# Patient Record
Sex: MALE
[De-identification: names, ages, dates, MRNs, and addresses within clinical notes are randomized per-mention and may not be internally consistent; named-entity substitution may affect disease eponyms.]

---

## 2017-02-15 ENCOUNTER — MEDICATION RENEWAL (OUTPATIENT)
Age: 62
End: 2017-02-15

## 2017-02-24 ENCOUNTER — RX RENEWAL (OUTPATIENT)
Age: 62
End: 2017-02-24

## 2017-03-13 ENCOUNTER — RX RENEWAL (OUTPATIENT)
Age: 62
End: 2017-03-13

## 2017-04-10 ENCOUNTER — RX RENEWAL (OUTPATIENT)
Age: 62
End: 2017-04-10

## 2017-05-08 ENCOUNTER — RX RENEWAL (OUTPATIENT)
Age: 62
End: 2017-05-08

## 2017-05-09 ENCOUNTER — APPOINTMENT (OUTPATIENT)
Dept: FAMILY MEDICINE | Facility: CLINIC | Age: 62
End: 2017-05-09

## 2017-05-09 VITALS
SYSTOLIC BLOOD PRESSURE: 126 MMHG | BODY MASS INDEX: 36.79 KG/M2 | DIASTOLIC BLOOD PRESSURE: 80 MMHG | WEIGHT: 257 LBS | HEIGHT: 70 IN

## 2017-05-10 LAB
ALBUMIN SERPL ELPH-MCNC: 4.6 G/DL
ALP BLD-CCNC: 40 U/L
ALT SERPL-CCNC: 39 U/L
ANION GAP SERPL CALC-SCNC: 21 MMOL/L
AST SERPL-CCNC: 33 U/L
BASOPHILS # BLD AUTO: 0.01 K/UL
BASOPHILS NFR BLD AUTO: 0.2 %
BILIRUB SERPL-MCNC: 0.8 MG/DL
BUN SERPL-MCNC: 22 MG/DL
CALCIUM SERPL-MCNC: 10.3 MG/DL
CHLORIDE SERPL-SCNC: 105 MMOL/L
CHOLEST SERPL-MCNC: 116 MG/DL
CHOLEST/HDLC SERPL: 3 RATIO
CO2 SERPL-SCNC: 20 MMOL/L
CREAT SERPL-MCNC: 1.04 MG/DL
EOSINOPHIL # BLD AUTO: 0.06 K/UL
EOSINOPHIL NFR BLD AUTO: 1 %
GLUCOSE SERPL-MCNC: 94 MG/DL
HCT VFR BLD CALC: 46.4 %
HDLC SERPL-MCNC: 34 MG/DL
HGB BLD-MCNC: 15.1 G/DL
IMM GRANULOCYTES NFR BLD AUTO: 0.2 %
LDLC SERPL CALC-MCNC: 65 MG/DL
LYMPHOCYTES # BLD AUTO: 1.24 K/UL
LYMPHOCYTES NFR BLD AUTO: 20.6 %
MAN DIFF?: NORMAL
MCHC RBC-ENTMCNC: 32.5 GM/DL
MCHC RBC-ENTMCNC: 33.7 PG
MCV RBC AUTO: 103.6 FL
MONOCYTES # BLD AUTO: 0.49 K/UL
MONOCYTES NFR BLD AUTO: 8.1 %
NEUTROPHILS # BLD AUTO: 4.22 K/UL
NEUTROPHILS NFR BLD AUTO: 69.9 %
PLATELET # BLD AUTO: 214 K/UL
POTASSIUM SERPL-SCNC: 5.2 MMOL/L
PROT SERPL-MCNC: 6.9 G/DL
RBC # BLD: 4.48 M/UL
RBC # FLD: 14.3 %
SODIUM SERPL-SCNC: 146 MMOL/L
TRIGL SERPL-MCNC: 83 MG/DL
URATE SERPL-MCNC: 5 MG/DL
WBC # FLD AUTO: 6.03 K/UL

## 2017-05-24 ENCOUNTER — MEDICATION RENEWAL (OUTPATIENT)
Age: 62
End: 2017-05-24

## 2017-08-20 ENCOUNTER — RX RENEWAL (OUTPATIENT)
Age: 62
End: 2017-08-20

## 2017-08-31 ENCOUNTER — APPOINTMENT (OUTPATIENT)
Dept: FAMILY MEDICINE | Facility: CLINIC | Age: 62
End: 2017-08-31
Payer: COMMERCIAL

## 2017-08-31 VITALS
WEIGHT: 242 LBS | HEIGHT: 70 IN | BODY MASS INDEX: 34.65 KG/M2 | DIASTOLIC BLOOD PRESSURE: 70 MMHG | SYSTOLIC BLOOD PRESSURE: 120 MMHG

## 2017-08-31 PROCEDURE — G0008: CPT

## 2017-08-31 PROCEDURE — 90688 IIV4 VACCINE SPLT 0.5 ML IM: CPT

## 2017-08-31 PROCEDURE — 36415 COLL VENOUS BLD VENIPUNCTURE: CPT

## 2017-08-31 PROCEDURE — 99214 OFFICE O/P EST MOD 30 MIN: CPT | Mod: 25

## 2017-08-31 RX ORDER — PENICILLIN V POTASSIUM 500 MG/1
500 TABLET, FILM COATED ORAL
Qty: 28 | Refills: 0 | Status: COMPLETED | COMMUNITY
Start: 2017-04-17

## 2017-09-01 LAB
ALBUMIN SERPL ELPH-MCNC: 4.4 G/DL
ALP BLD-CCNC: 41 U/L
ALT SERPL-CCNC: 22 U/L
ANION GAP SERPL CALC-SCNC: 17 MMOL/L
AST SERPL-CCNC: 23 U/L
BASOPHILS # BLD AUTO: 0.01 K/UL
BASOPHILS NFR BLD AUTO: 0.1 %
BILIRUB SERPL-MCNC: 0.7 MG/DL
BUN SERPL-MCNC: 16 MG/DL
CALCIUM SERPL-MCNC: 9.1 MG/DL
CHLORIDE SERPL-SCNC: 104 MMOL/L
CHOLEST SERPL-MCNC: 133 MG/DL
CHOLEST/HDLC SERPL: 3.5 RATIO
CO2 SERPL-SCNC: 22 MMOL/L
CREAT SERPL-MCNC: 0.82 MG/DL
EOSINOPHIL # BLD AUTO: 0.12 K/UL
EOSINOPHIL NFR BLD AUTO: 1.8 %
GLUCOSE SERPL-MCNC: 88 MG/DL
HBA1C MFR BLD HPLC: 5.4 %
HCT VFR BLD CALC: 45.4 %
HDLC SERPL-MCNC: 38 MG/DL
HGB BLD-MCNC: 14.6 G/DL
IMM GRANULOCYTES NFR BLD AUTO: 0.1 %
LDLC SERPL CALC-MCNC: 80 MG/DL
LYMPHOCYTES # BLD AUTO: 1.8 K/UL
LYMPHOCYTES NFR BLD AUTO: 26.4 %
MAN DIFF?: NORMAL
MCHC RBC-ENTMCNC: 32.2 GM/DL
MCHC RBC-ENTMCNC: 33 PG
MCV RBC AUTO: 102.5 FL
MONOCYTES # BLD AUTO: 0.5 K/UL
MONOCYTES NFR BLD AUTO: 7.3 %
NEUTROPHILS # BLD AUTO: 4.39 K/UL
NEUTROPHILS NFR BLD AUTO: 64.3 %
PLATELET # BLD AUTO: 222 K/UL
POTASSIUM SERPL-SCNC: 4.7 MMOL/L
PROT SERPL-MCNC: 6.9 G/DL
RBC # BLD: 4.43 M/UL
RBC # FLD: 13.5 %
SODIUM SERPL-SCNC: 143 MMOL/L
TRIGL SERPL-MCNC: 74 MG/DL
URATE SERPL-MCNC: 4.6 MG/DL
WBC # FLD AUTO: 6.83 K/UL

## 2017-10-31 ENCOUNTER — RX RENEWAL (OUTPATIENT)
Age: 62
End: 2017-10-31

## 2017-11-07 ENCOUNTER — MEDICATION RENEWAL (OUTPATIENT)
Age: 62
End: 2017-11-07

## 2017-11-14 ENCOUNTER — RX RENEWAL (OUTPATIENT)
Age: 62
End: 2017-11-14

## 2018-01-23 ENCOUNTER — APPOINTMENT (OUTPATIENT)
Dept: FAMILY MEDICINE | Facility: CLINIC | Age: 63
End: 2018-01-23
Payer: COMMERCIAL

## 2018-01-23 VITALS
WEIGHT: 240 LBS | SYSTOLIC BLOOD PRESSURE: 126 MMHG | HEIGHT: 70 IN | BODY MASS INDEX: 34.36 KG/M2 | DIASTOLIC BLOOD PRESSURE: 78 MMHG

## 2018-01-23 PROCEDURE — 36415 COLL VENOUS BLD VENIPUNCTURE: CPT

## 2018-01-23 PROCEDURE — 99213 OFFICE O/P EST LOW 20 MIN: CPT | Mod: 25

## 2018-01-25 LAB
ALBUMIN SERPL ELPH-MCNC: 4.5 G/DL
ALP BLD-CCNC: 34 U/L
ALT SERPL-CCNC: 22 U/L
ANION GAP SERPL CALC-SCNC: 15 MMOL/L
AST SERPL-CCNC: 18 U/L
BASOPHILS # BLD AUTO: 0.02 K/UL
BASOPHILS NFR BLD AUTO: 0.3 %
BILIRUB SERPL-MCNC: 0.6 MG/DL
BUN SERPL-MCNC: 24 MG/DL
CALCIUM SERPL-MCNC: 9.3 MG/DL
CHLORIDE SERPL-SCNC: 106 MMOL/L
CHOLEST SERPL-MCNC: 119 MG/DL
CHOLEST/HDLC SERPL: 2.9 RATIO
CO2 SERPL-SCNC: 22 MMOL/L
CREAT SERPL-MCNC: 0.92 MG/DL
EOSINOPHIL # BLD AUTO: 0.08 K/UL
EOSINOPHIL NFR BLD AUTO: 1.4 %
GLUCOSE SERPL-MCNC: 91 MG/DL
HCT VFR BLD CALC: 43.9 %
HDLC SERPL-MCNC: 41 MG/DL
HGB BLD-MCNC: 15.1 G/DL
IMM GRANULOCYTES NFR BLD AUTO: 0.2 %
LDLC SERPL CALC-MCNC: 64 MG/DL
LYMPHOCYTES # BLD AUTO: 1.67 K/UL
LYMPHOCYTES NFR BLD AUTO: 28.8 %
MAN DIFF?: NORMAL
MCHC RBC-ENTMCNC: 33.9 PG
MCHC RBC-ENTMCNC: 34.4 GM/DL
MCV RBC AUTO: 98.4 FL
MONOCYTES # BLD AUTO: 0.53 K/UL
MONOCYTES NFR BLD AUTO: 9.2 %
NEUTROPHILS # BLD AUTO: 3.48 K/UL
NEUTROPHILS NFR BLD AUTO: 60.1 %
PLATELET # BLD AUTO: 207 K/UL
POTASSIUM SERPL-SCNC: 4.4 MMOL/L
PROT SERPL-MCNC: 6.7 G/DL
RBC # BLD: 4.46 M/UL
RBC # FLD: 13 %
SODIUM SERPL-SCNC: 143 MMOL/L
TRIGL SERPL-MCNC: 72 MG/DL
URATE SERPL-MCNC: 5 MG/DL
WBC # FLD AUTO: 5.79 K/UL

## 2018-07-20 ENCOUNTER — APPOINTMENT (OUTPATIENT)
Dept: FAMILY MEDICINE | Facility: CLINIC | Age: 63
End: 2018-07-20
Payer: COMMERCIAL

## 2018-07-20 VITALS
TEMPERATURE: 98.7 F | HEART RATE: 78 BPM | HEIGHT: 70 IN | SYSTOLIC BLOOD PRESSURE: 120 MMHG | BODY MASS INDEX: 34.93 KG/M2 | WEIGHT: 244 LBS | DIASTOLIC BLOOD PRESSURE: 80 MMHG | OXYGEN SATURATION: 98 %

## 2018-07-20 PROCEDURE — 36415 COLL VENOUS BLD VENIPUNCTURE: CPT

## 2018-07-20 PROCEDURE — 99214 OFFICE O/P EST MOD 30 MIN: CPT | Mod: 25

## 2018-07-20 NOTE — HEALTH RISK ASSESSMENT
[] : No [No falls in past year] : Patient reported no falls in the past year [0] : 2) Feeling down, depressed, or hopeless: Not at all (0) [[id number]] : 0

## 2018-07-20 NOTE — HISTORY OF PRESENT ILLNESS
[de-identified] : 62 year old male is here for a followup visit. Patient is here for medication renewals and for blood work discussion. Medications and allergies were reviewed and assessed.  There has been no new medications since the last visit. Patient is feeling well with no active changes or issues since His last visit.\par

## 2018-07-20 NOTE — ASSESSMENT
[FreeTextEntry1] : The diagnosis of high cholesterol is established and patient is currently taking medications. Blood work was drawn in office and results will be reviewed and followed. The patient was counseled on a low cholesterol diet and on medication compliance. Patient was advised to generally limit foods fried in oil, high in saturated fat, cheese, eggs and red meat. Patient was advised to continue on current medications and to followup in office for necessary blood work in three months.\par \par gout - controlled\par \par trigeminal neuralgia - improved\par watches sugars

## 2018-07-20 NOTE — PHYSICAL EXAM
[Well Nourished] : well nourished [Clear to Auscultation] : lungs were clear to auscultation bilaterally [No Accessory Muscle Use] : no accessory muscle use [Regular Rhythm] : with a regular rhythm [Soft] : abdomen soft [No Joint Swelling] : no joint swelling [No Rash] : no rash [Normal Gait] : normal gait

## 2018-07-21 LAB
ALBUMIN SERPL ELPH-MCNC: 4.4 G/DL
ALP BLD-CCNC: 31 U/L
ALT SERPL-CCNC: 29 U/L
ANION GAP SERPL CALC-SCNC: 14 MMOL/L
AST SERPL-CCNC: 25 U/L
BASOPHILS # BLD AUTO: 0.01 K/UL
BASOPHILS NFR BLD AUTO: 0.2 %
BILIRUB SERPL-MCNC: 0.7 MG/DL
BUN SERPL-MCNC: 20 MG/DL
CALCIUM SERPL-MCNC: 8.9 MG/DL
CHLORIDE SERPL-SCNC: 108 MMOL/L
CHOLEST SERPL-MCNC: 121 MG/DL
CHOLEST/HDLC SERPL: 3.5 RATIO
CO2 SERPL-SCNC: 21 MMOL/L
CREAT SERPL-MCNC: 0.78 MG/DL
EOSINOPHIL # BLD AUTO: 0.1 K/UL
EOSINOPHIL NFR BLD AUTO: 2 %
GLUCOSE SERPL-MCNC: 96 MG/DL
HBA1C MFR BLD HPLC: 5.3 %
HCT VFR BLD CALC: 43.5 %
HDLC SERPL-MCNC: 35 MG/DL
HGB BLD-MCNC: 14.3 G/DL
IMM GRANULOCYTES NFR BLD AUTO: 0.2 %
LDLC SERPL CALC-MCNC: 66 MG/DL
LYMPHOCYTES # BLD AUTO: 1.62 K/UL
LYMPHOCYTES NFR BLD AUTO: 32.3 %
MAN DIFF?: NORMAL
MCHC RBC-ENTMCNC: 32.4 PG
MCHC RBC-ENTMCNC: 32.9 GM/DL
MCV RBC AUTO: 98.4 FL
MONOCYTES # BLD AUTO: 0.43 K/UL
MONOCYTES NFR BLD AUTO: 8.6 %
NEUTROPHILS # BLD AUTO: 2.85 K/UL
NEUTROPHILS NFR BLD AUTO: 56.7 %
PLATELET # BLD AUTO: 196 K/UL
POTASSIUM SERPL-SCNC: 4 MMOL/L
PROT SERPL-MCNC: 6.2 G/DL
RBC # BLD: 4.42 M/UL
RBC # FLD: 13.7 %
SODIUM SERPL-SCNC: 143 MMOL/L
TRIGL SERPL-MCNC: 98 MG/DL
URATE SERPL-MCNC: 4.8 MG/DL
WBC # FLD AUTO: 5.02 K/UL

## 2019-01-28 ENCOUNTER — RX RENEWAL (OUTPATIENT)
Age: 64
End: 2019-01-28

## 2019-01-28 ENCOUNTER — MEDICATION RENEWAL (OUTPATIENT)
Age: 64
End: 2019-01-28

## 2019-02-01 ENCOUNTER — RX RENEWAL (OUTPATIENT)
Age: 64
End: 2019-02-01

## 2019-02-08 ENCOUNTER — APPOINTMENT (OUTPATIENT)
Dept: FAMILY MEDICINE | Facility: CLINIC | Age: 64
End: 2019-02-08
Payer: COMMERCIAL

## 2019-02-08 VITALS
HEIGHT: 70 IN | DIASTOLIC BLOOD PRESSURE: 80 MMHG | SYSTOLIC BLOOD PRESSURE: 126 MMHG | BODY MASS INDEX: 34.79 KG/M2 | WEIGHT: 243 LBS

## 2019-02-08 DIAGNOSIS — Z83.3 FAMILY HISTORY OF DIABETES MELLITUS: ICD-10-CM

## 2019-02-08 DIAGNOSIS — Z82.49 FAMILY HISTORY OF ISCHEMIC HEART DISEASE AND OTHER DISEASES OF THE CIRCULATORY SYSTEM: ICD-10-CM

## 2019-02-08 DIAGNOSIS — I83.93 ASYMPTOMATIC VARICOSE VEINS OF BILATERAL LOWER EXTREMITIES: ICD-10-CM

## 2019-02-08 PROCEDURE — 36415 COLL VENOUS BLD VENIPUNCTURE: CPT

## 2019-02-08 PROCEDURE — 93000 ELECTROCARDIOGRAM COMPLETE: CPT

## 2019-02-08 PROCEDURE — 99396 PREV VISIT EST AGE 40-64: CPT | Mod: 25

## 2019-02-08 NOTE — HEALTH RISK ASSESSMENT
[Patient declined colonoscopy] : Patient declined colonoscopy [With Significant Other] : lives with significant other [# of Members in Household ___] :  household currently consist of [unfilled] member(s) [Employed] : employed [Reports changes in vision] : Reports changes in vision [Smoke Detector] : smoke detector [Carbon Monoxide Detector] : carbon monoxide detector [Seat Belt] :  uses seat belt [] : No [de-identified] : Neurologist-hasn't seen in a few year [de-identified] : Quit years ago [Reports changes in hearing] : Reports no changes in hearing [Reports changes in dental health] : Reports no changes in dental health [Sunscreen] : does not use sunscreen [FreeTextEntry2] : Part-time  [de-identified] : recent new glasses [de-identified] : sun coverage

## 2019-02-08 NOTE — REVIEW OF SYSTEMS
[Frequency] : frequency [Headache] : no headache [Dizziness] : no dizziness [Insomnia] : no insomnia [Negative] : Musculoskeletal [de-identified] : occassional trigeminal neuralgia symptoms

## 2019-02-08 NOTE — PHYSICAL EXAM
[No Acute Distress] : no acute distress [Well Nourished] : well nourished [Well Developed] : well developed [Well-Appearing] : well-appearing [Normal Outer Ear/Nose] : the outer ears and nose were normal in appearance [Normal Oropharynx] : the oropharynx was normal [Normal TMs] : both tympanic membranes were normal [Supple] : supple [No Lymphadenopathy] : no lymphadenopathy [No Respiratory Distress] : no respiratory distress  [Clear to Auscultation] : lungs were clear to auscultation bilaterally [No Accessory Muscle Use] : no accessory muscle use [Normal Rate] : normal rate  [Regular Rhythm] : with a regular rhythm [Normal S1, S2] : normal S1 and S2 [Soft] : abdomen soft [Non Tender] : non-tender [Non-distended] : non-distended [No Masses] : no abdominal mass palpated [Normal Bowel Sounds] : normal bowel sounds [Normal Affect] : the affect was normal [Alert and Oriented x3] : oriented to person, place, and time [Normal Mood] : the mood was normal [No Extremity Clubbing/Cyanosis] : no extremity clubbing/cyanosis [Grossly Normal Strength/Tone] : grossly normal strength/tone [de-identified] : trace edema lower extremities bilaterally, varicose veins. Nontender to palpation, no redness.

## 2019-02-08 NOTE — HISTORY OF PRESENT ILLNESS
[FreeTextEntry1] : Here for annual physical [de-identified] : Here for annual physical. \par \par History of trigeminal neuralgia-feels like his symptoms are more frequent/irritation on right side of face.  \par Admits to occasional fluttering sensation at rest, not when he is exercising. No chest pain, SOB. \par \par Also notes some right leg irritation around his veins.  Hx of DVT. \par Bicycles for exercise, varied diet.\par Had recent eye exam.

## 2019-02-11 LAB
25(OH)D3 SERPL-MCNC: 26.8 NG/ML
ALBUMIN SERPL ELPH-MCNC: 4.8 G/DL
ALP BLD-CCNC: 28 U/L
ALT SERPL-CCNC: 26 U/L
ANION GAP SERPL CALC-SCNC: 16 MMOL/L
APPEARANCE: CLEAR
AST SERPL-CCNC: 23 U/L
BASOPHILS # BLD AUTO: 0.02 K/UL
BASOPHILS NFR BLD AUTO: 0.4 %
BILIRUB SERPL-MCNC: 0.6 MG/DL
BILIRUBIN URINE: NEGATIVE
BLOOD URINE: NEGATIVE
BUN SERPL-MCNC: 23 MG/DL
CALCIUM SERPL-MCNC: 9.8 MG/DL
CHLORIDE SERPL-SCNC: 103 MMOL/L
CHOLEST SERPL-MCNC: 114 MG/DL
CHOLEST/HDLC SERPL: 2.9 RATIO
CO2 SERPL-SCNC: 23 MMOL/L
COLOR: YELLOW
CREAT SERPL-MCNC: 0.93 MG/DL
EOSINOPHIL # BLD AUTO: 0.1 K/UL
EOSINOPHIL NFR BLD AUTO: 2.1 %
GLUCOSE QUALITATIVE U: NEGATIVE MG/DL
GLUCOSE SERPL-MCNC: 101 MG/DL
HBA1C MFR BLD HPLC: 5.3 %
HCT VFR BLD CALC: 46.6 %
HDLC SERPL-MCNC: 39 MG/DL
HGB BLD-MCNC: 15.2 G/DL
IMM GRANULOCYTES NFR BLD AUTO: 0.2 %
KETONES URINE: NEGATIVE
LDLC SERPL CALC-MCNC: 63 MG/DL
LEUKOCYTE ESTERASE URINE: NEGATIVE
LYMPHOCYTES # BLD AUTO: 1.6 K/UL
LYMPHOCYTES NFR BLD AUTO: 34.3 %
MAN DIFF?: NORMAL
MCHC RBC-ENTMCNC: 32.6 GM/DL
MCHC RBC-ENTMCNC: 33.3 PG
MCV RBC AUTO: 102 FL
MONOCYTES # BLD AUTO: 0.49 K/UL
MONOCYTES NFR BLD AUTO: 10.5 %
NEUTROPHILS # BLD AUTO: 2.44 K/UL
NEUTROPHILS NFR BLD AUTO: 52.5 %
NITRITE URINE: NEGATIVE
PH URINE: 6
PLATELET # BLD AUTO: 211 K/UL
POTASSIUM SERPL-SCNC: 4.6 MMOL/L
PROT SERPL-MCNC: 6.7 G/DL
PROTEIN URINE: NEGATIVE MG/DL
PSA SERPL-MCNC: 0.5 NG/ML
RBC # BLD: 4.57 M/UL
RBC # FLD: 13.5 %
SODIUM SERPL-SCNC: 142 MMOL/L
SPECIFIC GRAVITY URINE: 1.02
TRIGL SERPL-MCNC: 62 MG/DL
TSH SERPL-ACNC: 1.47 UIU/ML
URATE SERPL-MCNC: 5 MG/DL
UROBILINOGEN URINE: NEGATIVE MG/DL
WBC # FLD AUTO: 4.66 K/UL

## 2019-02-22 ENCOUNTER — RX RENEWAL (OUTPATIENT)
Age: 64
End: 2019-02-22

## 2019-02-23 ENCOUNTER — RX RENEWAL (OUTPATIENT)
Age: 64
End: 2019-02-23

## 2019-05-13 ENCOUNTER — APPOINTMENT (OUTPATIENT)
Dept: OTOLARYNGOLOGY | Facility: CLINIC | Age: 64
End: 2019-05-13
Payer: COMMERCIAL

## 2019-05-13 VITALS
BODY MASS INDEX: 35.79 KG/M2 | HEART RATE: 70 BPM | SYSTOLIC BLOOD PRESSURE: 126 MMHG | DIASTOLIC BLOOD PRESSURE: 86 MMHG | WEIGHT: 250 LBS | HEIGHT: 70 IN

## 2019-05-13 DIAGNOSIS — G50.0 TRIGEMINAL NEURALGIA: ICD-10-CM

## 2019-05-13 DIAGNOSIS — H61.22 IMPACTED CERUMEN, LEFT EAR: ICD-10-CM

## 2019-05-13 PROCEDURE — 99204 OFFICE O/P NEW MOD 45 MIN: CPT | Mod: 25

## 2019-05-13 PROCEDURE — 92557 COMPREHENSIVE HEARING TEST: CPT

## 2019-05-13 PROCEDURE — 92567 TYMPANOMETRY: CPT

## 2019-05-13 PROCEDURE — G0268 REMOVAL OF IMPACTED WAX MD: CPT

## 2019-05-13 NOTE — ASSESSMENT
[FreeTextEntry1] : Patient with a history of a right-sided trigeminal neuralgia had neurosurgical procedure that decompresses trigeminal nucleus from a vein intracranially he still suffering from some of the side effects from successful surgery he comes in complaining o'clock left ear cerumen impaction was curetted out of her grand confirm perfectly normal presbycusis at pattern hearing loss patient was reassured no further acute intervention is indicated at this time.

## 2019-05-13 NOTE — HISTORY OF PRESENT ILLNESS
[de-identified] : PAtient is having left sided ear pain and some minor noises in the left ear that come and go. he has some dull aching pressure in the left side of the head as well. He states this all started about a week ago when he had some tingling in the left eye over a week ago. He did have a microvascular decompression in the right side of the face due to trigeminal neuralgia back in 2015. He is started to have some symptoms of trigeminal neuralgia but he's not diagnosed with that yet- he feels eyebrow tingling with some itching in the left cheek as well. He is due to have what he says is a brain scan after seeing his neurologist. He went for a dental scan and a blood test. THe noises in the left ear do not go along with his heartbeat but it is an irregular pulsation noise. He admits that he tried to put some wax cleaning liquid in the left ear but it just made the ear worse. he also admits to using Qtips

## 2019-05-21 ENCOUNTER — RX RENEWAL (OUTPATIENT)
Age: 64
End: 2019-05-21

## 2019-08-13 ENCOUNTER — RX RENEWAL (OUTPATIENT)
Age: 64
End: 2019-08-13

## 2019-08-16 ENCOUNTER — APPOINTMENT (OUTPATIENT)
Dept: FAMILY MEDICINE | Facility: CLINIC | Age: 64
End: 2019-08-16
Payer: COMMERCIAL

## 2019-08-16 VITALS
HEIGHT: 70 IN | HEART RATE: 75 BPM | RESPIRATION RATE: 18 BRPM | BODY MASS INDEX: 34.79 KG/M2 | DIASTOLIC BLOOD PRESSURE: 84 MMHG | WEIGHT: 243 LBS | OXYGEN SATURATION: 96 % | SYSTOLIC BLOOD PRESSURE: 120 MMHG

## 2019-08-16 PROCEDURE — 36415 COLL VENOUS BLD VENIPUNCTURE: CPT

## 2019-08-16 PROCEDURE — 99214 OFFICE O/P EST MOD 30 MIN: CPT | Mod: 25

## 2019-08-16 NOTE — ASSESSMENT
[FreeTextEntry1] : cholesterol\par The diagnosis of high cholesterol is established and patient is currently taking medications. Blood work was drawn in office and results will be reviewed and followed. The patient was counseled on a low cholesterol diet and on medication compliance. Patient was advised to generally limit foods fried in oil, high in saturated fat, cheese, eggs and red meat. Patient was advised to continue on current medications and to followup in office for necessary blood work in three months.\par \par gout - controlled\par will check uric acid\par \par trigeminal neuralgia - improved\par watches sugars

## 2019-08-16 NOTE — HEALTH RISK ASSESSMENT
[] : No [No falls in past year] : Patient reported no falls in the past year [0] : 2) Feeling down, depressed, or hopeless: Not at all (0) [[id number]] : 0

## 2019-08-16 NOTE — PHYSICAL EXAM
[Well Nourished] : well nourished [Clear to Auscultation] : lungs were clear to auscultation bilaterally [No Accessory Muscle Use] : no accessory muscle use [Regular Rhythm] : with a regular rhythm [Soft] : abdomen soft [No Joint Swelling] : no joint swelling [No Rash] : no rash [Normal Gait] : normal gait [Normal Insight/Judgement] : insight and judgment were intact

## 2019-08-16 NOTE — HISTORY OF PRESENT ILLNESS
[de-identified] : 63 year old male is here for a followup visit. Patient is here for medication renewals and for blood work discussion. Medications and allergies were reviewed and assessed.  There has been no new medications since the last visit. Patient is feeling well with no active changes or issues since His last visit.\par

## 2019-08-18 LAB
ALBUMIN SERPL ELPH-MCNC: 4.7 G/DL
ALP BLD-CCNC: 30 U/L
ALT SERPL-CCNC: 36 U/L
ANION GAP SERPL CALC-SCNC: 14 MMOL/L
AST SERPL-CCNC: 31 U/L
BILIRUB SERPL-MCNC: 0.6 MG/DL
BUN SERPL-MCNC: 20 MG/DL
CALCIUM SERPL-MCNC: 9.4 MG/DL
CHLORIDE SERPL-SCNC: 108 MMOL/L
CHOLEST SERPL-MCNC: 106 MG/DL
CHOLEST/HDLC SERPL: 3.7 RATIO
CO2 SERPL-SCNC: 22 MMOL/L
CREAT SERPL-MCNC: 0.94 MG/DL
ESTIMATED AVERAGE GLUCOSE: 108 MG/DL
GLUCOSE SERPL-MCNC: 101 MG/DL
HBA1C MFR BLD HPLC: 5.4 %
HDLC SERPL-MCNC: 29 MG/DL
LDLC SERPL CALC-MCNC: 57 MG/DL
POTASSIUM SERPL-SCNC: 4.6 MMOL/L
PROT SERPL-MCNC: 6.6 G/DL
SODIUM SERPL-SCNC: 144 MMOL/L
TRIGL SERPL-MCNC: 100 MG/DL
URATE SERPL-MCNC: 5.6 MG/DL

## 2020-01-14 ENCOUNTER — APPOINTMENT (OUTPATIENT)
Dept: FAMILY MEDICINE | Facility: CLINIC | Age: 65
End: 2020-01-14
Payer: COMMERCIAL

## 2020-01-14 VITALS
WEIGHT: 250 LBS | DIASTOLIC BLOOD PRESSURE: 80 MMHG | HEART RATE: 78 BPM | OXYGEN SATURATION: 98 % | BODY MASS INDEX: 35.79 KG/M2 | HEIGHT: 70 IN | TEMPERATURE: 98.1 F | SYSTOLIC BLOOD PRESSURE: 120 MMHG

## 2020-01-14 DIAGNOSIS — G44.009 CLUSTER HEADACHE SYNDROME, UNSPECIFIED, NOT INTRACTABLE: ICD-10-CM

## 2020-01-14 PROCEDURE — 99495 TRANSJ CARE MGMT MOD F2F 14D: CPT

## 2020-02-02 ENCOUNTER — RX RENEWAL (OUTPATIENT)
Age: 65
End: 2020-02-02

## 2020-02-04 ENCOUNTER — RX RENEWAL (OUTPATIENT)
Age: 65
End: 2020-02-04

## 2020-02-10 ENCOUNTER — RX RENEWAL (OUTPATIENT)
Age: 65
End: 2020-02-10

## 2020-02-11 ENCOUNTER — APPOINTMENT (OUTPATIENT)
Dept: FAMILY MEDICINE | Facility: CLINIC | Age: 65
End: 2020-02-11
Payer: COMMERCIAL

## 2020-02-11 VITALS
DIASTOLIC BLOOD PRESSURE: 82 MMHG | WEIGHT: 250 LBS | BODY MASS INDEX: 35.79 KG/M2 | RESPIRATION RATE: 17 BRPM | OXYGEN SATURATION: 94 % | HEIGHT: 70 IN | HEART RATE: 90 BPM | SYSTOLIC BLOOD PRESSURE: 124 MMHG

## 2020-02-11 VITALS — TEMPERATURE: 98.5 F

## 2020-02-11 PROCEDURE — 99214 OFFICE O/P EST MOD 30 MIN: CPT

## 2020-02-11 NOTE — HISTORY OF PRESENT ILLNESS
[FreeTextEntry1] : Here for follow-up.   [de-identified] : Here for follow-up, medication refills. \par Needs refill of gout medication, no recent flares. \par Saturday mild temperature.\par Diarrhea-loose stools today. 2 loose stools. Improving overall. Was able to eat chinese food today. \par \par Otherwise feeling okay. \par \par \par \par

## 2020-02-11 NOTE — PHYSICAL EXAM
[Normal Oropharynx] : the oropharynx was normal [Normal] : no respiratory distress, lungs were clear to auscultation bilaterally and no accessory muscle use [de-identified] : impacted cerumen left ear

## 2020-02-11 NOTE — PLAN
[FreeTextEntry1] : Patient not fasting.  Will go to lab for fasting bloodwork, rx given.\par Monitor diet, avoid spicy, greasy, acidic. \par \par Following with Cardiology. \par \par Advised can trial Debrox drops and return for ear irrigation for impacted cerumen. \par

## 2020-02-11 NOTE — REVIEW OF SYSTEMS
[Diarrhea] : diarrhea [Negative] : Neurological [Abdominal Pain] : no abdominal pain [Vomiting] : no vomiting [Nausea] : no nausea

## 2020-02-18 LAB
ALBUMIN SERPL ELPH-MCNC: 4.8 G/DL
ALP BLD-CCNC: 30 U/L
ALT SERPL-CCNC: 22 U/L
ANION GAP SERPL CALC-SCNC: 14 MMOL/L
AST SERPL-CCNC: 20 U/L
BASOPHILS # BLD AUTO: 0.03 K/UL
BASOPHILS NFR BLD AUTO: 0.5 %
BILIRUB SERPL-MCNC: 0.6 MG/DL
BUN SERPL-MCNC: 19 MG/DL
CALCIUM SERPL-MCNC: 9.7 MG/DL
CHLORIDE SERPL-SCNC: 106 MMOL/L
CHOLEST SERPL-MCNC: 85 MG/DL
CHOLEST/HDLC SERPL: 3.1 RATIO
CO2 SERPL-SCNC: 24 MMOL/L
CREAT SERPL-MCNC: 0.92 MG/DL
EOSINOPHIL # BLD AUTO: 0.1 K/UL
EOSINOPHIL NFR BLD AUTO: 1.5 %
ESTIMATED AVERAGE GLUCOSE: 114 MG/DL
GLUCOSE SERPL-MCNC: 82 MG/DL
HBA1C MFR BLD HPLC: 5.6 %
HCT VFR BLD CALC: 44.7 %
HDLC SERPL-MCNC: 28 MG/DL
HGB BLD-MCNC: 15 G/DL
IMM GRANULOCYTES NFR BLD AUTO: 0.3 %
LDLC SERPL CALC-MCNC: 37 MG/DL
LYMPHOCYTES # BLD AUTO: 1.89 K/UL
LYMPHOCYTES NFR BLD AUTO: 28.9 %
MAN DIFF?: NORMAL
MCHC RBC-ENTMCNC: 33 PG
MCHC RBC-ENTMCNC: 33.6 GM/DL
MCV RBC AUTO: 98.2 FL
MONOCYTES # BLD AUTO: 0.6 K/UL
MONOCYTES NFR BLD AUTO: 9.2 %
NEUTROPHILS # BLD AUTO: 3.89 K/UL
NEUTROPHILS NFR BLD AUTO: 59.6 %
PLATELET # BLD AUTO: 246 K/UL
POTASSIUM SERPL-SCNC: 4.5 MMOL/L
PROT SERPL-MCNC: 6.9 G/DL
RBC # BLD: 4.55 M/UL
RBC # FLD: 12.6 %
SODIUM SERPL-SCNC: 144 MMOL/L
TRIGL SERPL-MCNC: 101 MG/DL
TSH SERPL-ACNC: 1.36 UIU/ML
URATE SERPL-MCNC: 5.3 MG/DL
WBC # FLD AUTO: 6.53 K/UL

## 2020-04-29 ENCOUNTER — RX RENEWAL (OUTPATIENT)
Age: 65
End: 2020-04-29

## 2020-05-20 ENCOUNTER — RX RENEWAL (OUTPATIENT)
Age: 65
End: 2020-05-20

## 2020-06-30 ENCOUNTER — HOSPITAL ENCOUNTER (OUTPATIENT)
Dept: GENERAL RADIOLOGY | Age: 65
Discharge: HOME | End: 2020-06-30
Payer: MEDICARE

## 2020-06-30 ENCOUNTER — HOSPITAL ENCOUNTER (OUTPATIENT)
Age: 65
Discharge: HOME | End: 2020-06-30
Payer: MEDICARE

## 2020-06-30 PROCEDURE — 72100 X-RAY EXAM L-S SPINE 2/3 VWS: CPT

## 2020-07-10 ENCOUNTER — HOSPITAL ENCOUNTER (OUTPATIENT)
Dept: PHYSICAL THERAPY | Age: 65
Setting detail: THERAPIES SERIES
Discharge: HOME | End: 2020-07-10
Payer: MEDICARE

## 2020-07-10 PROCEDURE — 97110 THERAPEUTIC EXERCISES: CPT

## 2020-07-10 PROCEDURE — 97140 MANUAL THERAPY 1/> REGIONS: CPT

## 2020-07-10 PROCEDURE — 97530 THERAPEUTIC ACTIVITIES: CPT

## 2020-07-10 PROCEDURE — 97161 PT EVAL LOW COMPLEX 20 MIN: CPT

## 2020-07-10 NOTE — PROGRESS NOTES
The following patient has been evaluated for physical therapy services. In order for therapy to continue, Medicare requires physician review of the treatment plan. Please review the attached evaluation and/or summary of the patient's plan of care, and verify that you agree by signing below and sending it back to our office. Thank you for this referral.    Physician signature_______________________ Date________________    Fax to:   [facility] [phone]    LOWER EXTREMITY PHYSICAL THERAPY EVALUATION    Evaluation Date:  7/10/2020         Patient Name:  Carlos Barajas       YOB: 1955       Medical Diagnosis:chronic ankle pain         ICD 10:      Treatment Diagnosis:  Same. Lower R leg pain    Onset Date: 2018     Referral Date: 6/30/20     Referring Physician:  Karson Zuñiga [id number]      Visits Allowed/Insurance/Certification Information:  paramount    Restrictions/Precautions:      Pt's Occupation/Job Duties:  Not employed. 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 Goes to basement 1x wk. Has 1 acre of grass. 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 He felt he fully recovered from that injury. His R lateral ankle and lateral calf started hurting 2 yrs ago, it has worsened. He  Has popping in his lateral ankle with walking. He has a total gym that he might start using. He anderson LBP that radiates primarily down the L leg., has started in his right leg. Pain    Patient describes pain to be constant after about 1 hr of standing. It will go away if he gets off of it  But this takes many hours. He continues to do all the work he wants.   Patient reports  1-2/10 pain at rest, gastroc muscle belly. Knot present in lateral gastroc. Joint Mobility/Accessory Motions  decr mobility of the R calcaneous. Globally. Increased laxity of the R ankle ant and p0st   t     Special Tests    Test Left Right Comments Test Left  Right Comments   Anterior Drawer  + laxity Neg pain Varus Stress      Posterior Drawer  +laxity Neg pain Valgus Stress      Lachmans    Scouring      VMO Tone            Functional Outcome Measure    Measure used:  NT  Score:    % Disability:      ASSESSMENT  Presents with ongoing progressive R ankle and lateral calf pain . Objectively he presents with mild weakness, ligamentous laxity of the lateral ankle both ant and post, and leg length discrepancy. PT will be necesssary and helpful to normalize these areas. Problems              Decreased strength   Decreased joint mobility- calc    Increased pain   Decreased flexibility   Poor posture/alignment    Rehabilitation Potential:  Good for goals listed below. Strengths for achieving goals include:motivated   Limitations for achieving goals include: none expected    Prognosis: [x]    Good []    Fair  []    Poor    GOALS  G Code:    Short Term Goals ( 2   weeks) Long Term Goals ( 4  weeks)   1). Establish HEP 1). (I) HEP   2). look at leg length vs L arch 2). strength to 5/5   3). strength to 4+ 3). pain improved 75%   4). decr pain 25% 4). 5). 5).   6). 6). PLAN OF CARE    To see patient  2 x/week for4   weeks for the following treatment interventions:     Therapeutic Exercise   Progressive Resistive Exercise   Modalities of Choice (Heat/Cold/US/EStim/Ionto)   Gait Training   Home Exercise Program   Manual Techniques/Mobilization   Postural Reeducation   Balance Reeducation    Thank you for the referral of this patient.      Timed Code Treatment Minutes:  45  minutes              Total Treatment Time:  60  minutes      Raul Hylton PT  license #[id number]

## 2020-07-10 NOTE — FLOWSHEET NOTE
[address] [facility] and [facility], [address]  Phone [phone]                                                     [x] Daily Treatment Note   [] Progress Note   [] Discharge Note      Date:  7/10/2020    Patient Name:  Nate Molina        :  1955    Restrictions/Precautions:      C-SSRS Triggered by Intake questionnaire (Past 2 wk assessment):   [x] No, Questionnaire did not trigger screening.   [] Yes, Patient intake triggered further evaluation      [] C-SSRS Screening completed  [] PCP notified via Plan of Care  [] Emergency services notified     Diagnosis:  Chronic R ankle pain. Treatment Diagnosis:  Same. Also hx of LBP    Insurance/Certification information:  paramount    Referring Physician:  Sy PAULSON [facility]    Plan of care signed (Y/N):      Progress report will be due (10 Rx or 30 days whichever is less):  27     Recertification will be due (POC Duration  / 90 days whichever is less): na    Visit# / total visits:  1.8    Pain level: 2-6/10    With time on his feet. Subjective:  7/10/20 about 40 yrs ago he had 300 lbs of wood fall on the back of his leg- he had a tear in his muscle. He felt he fully recovered from that injury. His R lateral ankle and lateral calf started hurting 2 yrs ago, it has worsened. He  Has popping in his lateral ankle with walking. He has a total gym that he might start using. He anderson LBP that radiates primarily down the L leg., has started in his right leg.     Pain    Patient describes pain to be constant after about 1 hr of standing. It will go away if he gets off of it  But this takes many hours. He continues to do all the work he wants. Patient reports  1-2/10 pain at rest,  5-6/10 pain with activity  Worsened by - time on his feet. Improved by- avoiding time on his feet. Pt. also reports that the knee/hip/ankle gives out, locks, pops, grinds. Yes  popping  Current Functional Limitations:  He is more limited by his LB  PLOF:indep  Pt. Sleep is disturbed? no     Patient goal for therapy:  Pain relief. Learn what to do at home.         Exercises:   Chronic R ankle pain.    (pt has lumbar DDD and anterior listhesis. L4 on L5). Exercise/Equipment Resistance/Repetitions Other comments   7/10/20 explained course and role of PT     Ankle x 3 directions Green  2x10  incr to 3x 10    Heel raise 2x10  \"    NV- gastroc and soleus stretches. Toe ex. High level balance. Look at leg length on R and arch on L. Closed chain                                                                         Pt may need lace up ankle brace for suppport. Therapeutic Exercise:   15 min    Group Therapy:      Home Exercise Program:      Therapeutic Activity:    15 min Discussed total gym at length. How to re start his ex program. Discussed LB ex. Neuromuscular Re-education:      Gait:      Manual Therapy:  15      STM over lateral gastroc while prone. calc dist mobiliz  1st and 5th rays.     Canalith Repositioning Procedure:       Modalities:      Timed Code Treatment Minutes:  45    Total Treatment Minutes:  60   eval  TA,  Ex,  man    Medicare Cap Total YTD:      Treatment/Activity Tolerance:    [x] Patient tolerated treatment well [] Patient limited by fatigue   [] Patient limited by pain [] Patient limited by other medical complications   [] Other:     Prognosis: [x] Good [] Fair  [] Poor    Patient Requires Follow-up:  [x] Yes  [] No    Plan: [] Continue per plan of care [] Alter current plan (see comments)   [] Plan of care initiated [] Hold pending MD visit [] Discharge    Plan for Next Session:      See Progress Note: [] Yes  [] No       Next due:         Electronically signed by:  Festus Esposito [id number]    Note: If patient does not return for scheduled/ recommended follow up visits, this note will serve as a discharge from care along with most recent update on progress. Outpatient Physical Therapy  Progress Note      Progress Note covers period from:     To       Subjective:           Objective:   Observation:   Test measurements:       Functional Outcome Measure:            Assessment:   Summary:    Patient's response to treatment:      Goals:  · Progress toward previous goals:      Plan:  ·     Electronically Signed by:

## 2020-07-21 ENCOUNTER — HOSPITAL ENCOUNTER (OUTPATIENT)
Dept: PHYSICAL THERAPY | Age: 65
Setting detail: THERAPIES SERIES
Discharge: HOME | End: 2020-07-21
Payer: MEDICARE

## 2020-07-21 PROCEDURE — 97140 MANUAL THERAPY 1/> REGIONS: CPT

## 2020-07-21 PROCEDURE — 97110 THERAPEUTIC EXERCISES: CPT

## 2020-07-21 NOTE — FLOWSHEET NOTE
SURGERY SCHEDULING REQUIREMENTS INCLUDE:    Facility: [facility]   Admission Type: Day Surgery   Time Needed: 45 minutes  Anesthesia: IV SED  Special Equipment: none   Procedure:   L1, L2, L3, L4 RFA  74213  [id number]  [id number]  Dx Code: m47.817  Anticoagulation:   Is the patient on Coumadin/Warfarin, Lovenox, Plavix, or Aspirin/Excedrin? No.  MRSA: No   Pacemaker: No   Stroke/MI past 6 months: No   Special Instructions: Under Fluoroscopy  BMI Estimated body mass index is 16.24 kg/m² as calculated from the following:    Height as of 10/10/18: 5' 3\" (1.6 m).    Weight as of 10/10/18: 41.6 kg.   Sleep Apnea: No  Latex Allergy: No           serve as a discharge from care along with most recent update on progress. Outpatient Physical Therapy  Progress Note      Progress Note covers period from:     To       Subjective:           Objective:   Observation:   Test measurements:       Functional Outcome Measure:            Assessment:   Summary:    Patient's response to treatment:      Goals:  · Progress toward previous goals:      Plan:  ·     Electronically Signed by:

## 2020-07-22 ENCOUNTER — RX RENEWAL (OUTPATIENT)
Age: 65
End: 2020-07-22

## 2020-07-23 ENCOUNTER — HOSPITAL ENCOUNTER (OUTPATIENT)
Dept: PHYSICAL THERAPY | Age: 65
Setting detail: THERAPIES SERIES
Discharge: HOME | End: 2020-07-23
Payer: MEDICARE

## 2020-07-23 PROCEDURE — 97110 THERAPEUTIC EXERCISES: CPT

## 2020-07-23 PROCEDURE — 97140 MANUAL THERAPY 1/> REGIONS: CPT

## 2020-07-23 NOTE — FLOWSHEET NOTE
[address] [place] [address] [facility] [address]  Phone [phone]                                                     [x] Daily Treatment Note   [] Progress Note   [] Discharge Note      Date:  2020    Patient Name:  Carolina Pineda        :  1955    Restrictions/Precautions:      C-SSRS Triggered by Intake questionnaire (Past 2 wk assessment):   [x] No, Questionnaire did not trigger screening.   [] Yes, Patient intake triggered further evaluation      [] C-SSRS Screening completed  [] PCP notified via Plan of Care  [] Emergency services notified     Diagnosis:  Chronic R ankle pain. Treatment Diagnosis:  Same. Also hx of LBP    Insurance/Certification information:  paramount    Ref[facility] Physician[facility]  [facility]SON                     [facility]    Plan of care signed (Y/N):      Progress report will be due (10 Rx or 30 days whichever is less):  44     Recertification will be due (POC Duration  / 90 days whichever is less): na    Visit# / total visits:  3/8    Pain level: 0-6/10    With time on his feet. Subjective: 20  Reports he is doing better after new ex and has not been popping a lot either  20  Reports he has been working outside all day and is sore in his back but has not hurt ankle. Reports ex are helping his ankle  7/10/20 about 40 yrs ago he had 300 lbs of wood fall on the back of his leg- he had a tear in his muscle. He felt he fully recovered from that injury. His R lateral ankle and lateral calf started hurting 2 yrs ago, it has worsened. He  Has popping in his lateral ankle with walking. He has a total gym that he might start using. He anderson LBP that radiates primarily down the L leg., has started in his right leg.     Pain    Patient describes pain to be constant after about 1 hr of standing. It will go away if he gets off of it  But this takes many hours.   He continues to do all the work he wants. Patient reports  1-2/10 pain at rest,  5-6/10 pain with activity  Worsened by - time on his feet. Improved by- avoiding time on his feet. Pt. also reports that the knee/hip/ankle gives out, locks, pops, grinds. Yes  popping  Current Functional Limitations:  He is more limited by his LB  PLOF:indep  Pt. Sleep is disturbed? no     Patient goal for therapy:  Pain relief. Learn what to do at home.         Exercises:   Chronic R ankle pain.    (pt has lumbar DDD and anterior listhesis. L4 on L5). Exercise/Equipment Resistance/Repetitions Other comments   7/10/20 explained course and role of PT     nustep S10 L5 x 5'    Ankle x 3 directions Green 3x10    Heel raise 3x10   Single leg Hands for support   Towel crunches 3x10    gastroc and soleus stretch 3x20-30\" ea twinged on one of the stretches    Step ups fwd 4\" 2x10    Step ups bwd 4\" 2x10    Foam balance  2 leg 1'    Double leg heel raise  On foam 3x10    NV-       Look at leg length on R and arch on L. Pt may need lace up ankle brace for suppport. Therapeutic Exercise:   33 min    Group Therapy:      Home Exercise Program:      Therapeutic Activity:     min       Neuromuscular Re-education:      Gait:      Manual Therapy:  15      STM over lateral gastroc while prone. calc dist mobiliz  1st and 5th rays.      Modalities:      Timed Code Treatment Minutes:  43    Total Treatment Minutes:  37    2 Ex,  1 man    Medicare Cap Total YTD:      Treatment/Activity Tolerance:    [x] Patient tolerated treatment well [] Patient limited by fatigue   [] Patient limited by pain [] Patient limited by other medical complications   [] Other:     Prognosis: [x] Good [] Fair  [] Poor    Patient Requires Follow-up:  [x] Yes  [] No    Plan: [x] Continue per plan of care [] Alter current plan (see comments)   [] Plan of care initiated [] Hold pending MD visit [] Discharge    Plan for Next

## 2020-07-28 ENCOUNTER — HOSPITAL ENCOUNTER (OUTPATIENT)
Dept: PHYSICAL THERAPY | Age: 65
Setting detail: THERAPIES SERIES
Discharge: HOME | End: 2020-07-28
Payer: MEDICARE

## 2020-07-28 PROCEDURE — 97140 MANUAL THERAPY 1/> REGIONS: CPT

## 2020-07-28 PROCEDURE — 97110 THERAPEUTIC EXERCISES: CPT

## 2020-07-28 NOTE — FLOWSHEET NOTE
[address] [facility], [address]  Phone [phone]                                                     [x] Daily Treatment Note   [] Progress Note   [] Discharge Note      Date:  2020    Patient Name:  Stefanie Davis        :  1955    Restrictions/Precautions:      C-SSRS Triggered by Intake questionnaire (Past 2 wk assessment):   [x] No, Questionnaire did not trigger screening.   [] Yes, Patient intake triggered further evaluation      [] C-SSRS Screening completed  [] PCP notified via Plan of Care  [] Emergency services notified     Diagnosis:  Chronic R ankle pain. Treatment Diagnosis:  Same. Also hx of LBP    Insurance/Certification information:  [employer]    Referring Physician:  Abdias PAULSON MT Orab    Plan of care signed (Y/N):      Progress report will be due (10 Rx or 30 days whichever is less):  02     Recertification will be due (POC Duration  / 90 days whichever is less): na    Visit# / total visits:      Pain level: 0-4/10    With time on his feet. Subjective:  20 reports pain is about 75% improved. Able to be up on his feet an hour now and has a quicker recovery time. Reports he continues to improve, reports after stretching the popping in his ankle stops  20  Reports he is doing better after new ex and has not been popping a lot either  20  Reports he has been working outside all day and is sore in his back but has not hurt ankle. Reports ex are helping his ankle  7/10/20 about 40 yrs ago he had 300 lbs of wood fall on the back of his leg- he had a tear in his muscle. He felt he fully recovered from that injury. His R lateral ankle and lateral calf started hurting 2 yrs ago, it has worsened. He  Has popping in his lateral ankle with walking. He has a total gym that he might start using.   He anderson LBP that radiates primarily down the L leg., has started in his right leg.     Pain    Patient describes pain to be constant after about 1 hr of standing. It will go away if he gets off of it  But this takes many hours. He continues to do all the work he wants. Patient reports  1-2/10 pain at rest,  5-6/10 pain with activity  Worsened by - time on his feet. Improved by- avoiding time on his feet. Pt. also reports that the knee/hip/ankle gives out, locks, pops, grinds. Yes  popping  Current Functional Limitations:  He is more limited by his LB  PLOF:indep  Pt. Sleep is disturbed? no     Patient goal for therapy:  Pain relief. Learn what to do at home.         Exercises:   Chronic R ankle pain.    (pt has lumbar DDD and anterior listhesis. L4 on L5). Exercise/Equipment Resistance/Repetitions Other comments   7/10/20 explained course and role of PT     nustep S10 L5 x 5'    Ankle x 3 directions blue 3x10    Heel raise 3x10   Single leg Hands for support   Towel crunches 3x10    gastroc and soleus stretch 3x20-30\" ea twinged on one of the stretches    Step ups fwd 6\" 2x10    Step ups bwd 6\" 2x10    Foam balance  2 leg 1'    Double leg heel raise  On foam 3x10    BOSU dome up Balance 1', wt shift 1'                                                                        Pt may need lace up ankle brace for suppport. Therapeutic Exercise:   27 min    Group Therapy:      Home Exercise Program:      Therapeutic Activity:     min       Neuromuscular Re-education:      Gait:      Manual Therapy:  25     STM over lateral gastroc while prone. calc dist mobiliz  1st and 5th rays.    PT assessment: 7/28/20 by Paige Villarreal  [address]  Lower on left posterior and anterior   L functions shorter - L foot flatter    Sit up test, march, FF +L  heel lift provided with some decrease in sx    Modalities:      Timed Code Treatment Minutes:  52    Total Treatment Minutes:  52   2 Ex,  2 man    Medicare Cap Total YTD:      Treatment/Activity Tolerance:    [x] Patient tolerated treatment well [] Patient limited by fatigue   [] Patient limited by pain [] Patient limited by other medical complications   [] Other:     Prognosis: [x] Good [] Fair  [] Poor    Patient Requires Follow-up:  [x] Yes  [] No    Plan: [x] Continue per plan of care [] Alter current plan (see comments)   [] Plan of care initiated [] Hold pending MD visit [] Discharge    Plan for Next Session:      See Progress Note: [] Yes  [x] No       Next due:         Electronically signed by:  Sandra Sapp, [id number]    Note: If patient does not return for scheduled/ recommended follow up visits, this note will serve as a discharge from care along with most recent update on progress. Outpatient Physical Therapy  Progress Note      Progress Note covers period from: To       Subjective:           Objective:   Observation:   Test measurements:       Functional Outcome Measure:         Assessment:   Summary:    Patient's response to treatment:      Goals:GOALS  G Code:    Short Term Goals ( 2   weeks) Long Term Goals ( 4  weeks)   1). Establish HEP 1). (I) HEP   2). look at leg length vs L arch 2). strength to 5/5   3). strength to 4+ 3). pain improved 75%   4). decr pain 25% 4). 5). 5).   6). 6).        · Progress toward previous goals:      Plan:  ·     Electronically Signed by:

## 2020-07-31 ENCOUNTER — HOSPITAL ENCOUNTER (OUTPATIENT)
Dept: PHYSICAL THERAPY | Age: 65
Setting detail: THERAPIES SERIES
Discharge: HOME | End: 2020-07-31
Payer: MEDICARE

## 2020-07-31 PROCEDURE — 97110 THERAPEUTIC EXERCISES: CPT

## 2020-07-31 PROCEDURE — 97140 MANUAL THERAPY 1/> REGIONS: CPT

## 2020-07-31 NOTE — DISCHARGE SUMMARY
LBP that radiates primarily down the L leg., has started in his right leg.     Pain    Patient describes pain to be constant after about 1 hr of standing. It will go away if he gets off of it  But this takes many hours. He continues to do all the work he wants. Patient reports  1-2/10 pain at rest,  5-6/10 pain with activity  Worsened by - time on his feet. Improved by- avoiding time on his feet. Pt. also reports that the knee/hip/ankle gives out, locks, pops, grinds. Yes  popping  Current Functional Limitations:  He is more limited by his LB  PLOF:indep  Pt. Sleep is disturbed? no     Patient goal for therapy:  Pain relief. Learn what to do at home.         Exercises:   Chronic R ankle pain.    (pt has lumbar DDD and anterior listhesis. L4 on L5). Exercise/Equipment Resistance/Repetitions Other comments   7/10/20 explained course and role of PT     nustep S10 L5 x 5'    Ankle x 3 directions blue 3x10    Heel raise 3x10   Single leg Hands for support   Towel crunches 3x10    gastroc and soleus stretch 3x20-30\" ea twinged on one of the stretches    Step ups fwd 6\" 2x10    Step ups bwd 6\" 2x10    Foam balance  2 leg 1'    Double leg heel raise  On foam 3x10    BOSU dome up Balance 1', wt shift 1'                                                                        Pt may need lace up ankle brace for suppport. Therapeutic Exercise:    30 min    Group Therapy:      Home Exercise Program:      Therapeutic Activity:     min       Neuromuscular Re-education:      Gait:      Manual Therapy:   13    STM over lateral gastroc while prone. calc dist mobiliz  1st and 5th rays.    PT assessment: 7/28/20 by Adi Lr  [facility] [address]  Lower on left posterior and anterior   L functions shorter - L foot flatter    Sit up test, march, FF +L  heel lift provided with some decrease in sx    Modalities:      Timed Code Treatment Minutes:  43    Total Treatment Minutes:   45                         2 Ex, 1

## 2020-08-06 ENCOUNTER — RX RENEWAL (OUTPATIENT)
Age: 65
End: 2020-08-06

## 2020-08-06 ENCOUNTER — APPOINTMENT (OUTPATIENT)
Dept: FAMILY MEDICINE | Facility: CLINIC | Age: 65
End: 2020-08-06
Payer: COMMERCIAL

## 2020-08-06 VITALS
HEART RATE: 71 BPM | DIASTOLIC BLOOD PRESSURE: 67 MMHG | BODY MASS INDEX: 37.8 KG/M2 | TEMPERATURE: 97.4 F | OXYGEN SATURATION: 97 % | SYSTOLIC BLOOD PRESSURE: 115 MMHG | WEIGHT: 264 LBS | HEIGHT: 70 IN | RESPIRATION RATE: 17 BRPM

## 2020-08-06 PROCEDURE — 36415 COLL VENOUS BLD VENIPUNCTURE: CPT

## 2020-08-06 PROCEDURE — 99396 PREV VISIT EST AGE 40-64: CPT | Mod: 25

## 2020-08-07 LAB
25(OH)D3 SERPL-MCNC: 36.5 NG/ML
ALBUMIN SERPL ELPH-MCNC: 4.5 G/DL
ALP BLD-CCNC: 30 U/L
ALT SERPL-CCNC: 22 U/L
ANION GAP SERPL CALC-SCNC: 15 MMOL/L
APPEARANCE: CLEAR
AST SERPL-CCNC: 21 U/L
BACTERIA: NEGATIVE
BASOPHILS # BLD AUTO: 0.03 K/UL
BASOPHILS NFR BLD AUTO: 0.5 %
BILIRUB SERPL-MCNC: 0.7 MG/DL
BILIRUBIN URINE: NEGATIVE
BLOOD URINE: NEGATIVE
BUN SERPL-MCNC: 17 MG/DL
CALCIUM SERPL-MCNC: 9.2 MG/DL
CHLORIDE SERPL-SCNC: 107 MMOL/L
CHOLEST SERPL-MCNC: 111 MG/DL
CHOLEST/HDLC SERPL: 3.9 RATIO
CO2 SERPL-SCNC: 23 MMOL/L
COLOR: YELLOW
CREAT SERPL-MCNC: 0.9 MG/DL
EOSINOPHIL # BLD AUTO: 0.11 K/UL
EOSINOPHIL NFR BLD AUTO: 1.8 %
ESTIMATED AVERAGE GLUCOSE: 105 MG/DL
GLUCOSE QUALITATIVE U: NEGATIVE
GLUCOSE SERPL-MCNC: 76 MG/DL
HBA1C MFR BLD HPLC: 5.3 %
HCT VFR BLD CALC: 44.6 %
HDLC SERPL-MCNC: 29 MG/DL
HGB BLD-MCNC: 14.4 G/DL
HYALINE CASTS: 0 /LPF
IMM GRANULOCYTES NFR BLD AUTO: 0.2 %
KETONES URINE: ABNORMAL
LDLC SERPL CALC-MCNC: 64 MG/DL
LEUKOCYTE ESTERASE URINE: NEGATIVE
LYMPHOCYTES # BLD AUTO: 1.63 K/UL
LYMPHOCYTES NFR BLD AUTO: 27.3 %
MAN DIFF?: NORMAL
MCHC RBC-ENTMCNC: 32.3 GM/DL
MCHC RBC-ENTMCNC: 33.4 PG
MCV RBC AUTO: 103.5 FL
MICROSCOPIC-UA: NORMAL
MONOCYTES # BLD AUTO: 0.56 K/UL
MONOCYTES NFR BLD AUTO: 9.4 %
NEUTROPHILS # BLD AUTO: 3.64 K/UL
NEUTROPHILS NFR BLD AUTO: 60.8 %
NITRITE URINE: NEGATIVE
PH URINE: 5.5
PLATELET # BLD AUTO: 199 K/UL
POTASSIUM SERPL-SCNC: 4.7 MMOL/L
PROT SERPL-MCNC: 6.6 G/DL
PROTEIN URINE: NEGATIVE
PSA SERPL-MCNC: 0.45 NG/ML
RBC # BLD: 4.31 M/UL
RBC # FLD: 13.3 %
RED BLOOD CELLS URINE: 0 /HPF
SODIUM SERPL-SCNC: 144 MMOL/L
SPECIFIC GRAVITY URINE: 1.03
SQUAMOUS EPITHELIAL CELLS: 0 /HPF
TRIGL SERPL-MCNC: 91 MG/DL
TSH SERPL-ACNC: 1.57 UIU/ML
UROBILINOGEN URINE: NORMAL
WBC # FLD AUTO: 5.98 K/UL
WHITE BLOOD CELLS URINE: 1 /HPF

## 2020-08-08 LAB
SARS-COV-2 IGG SERPL IA-ACNC: 43 AU/ML
SARS-COV-2 IGG SERPL QL IA: POSITIVE

## 2020-10-29 ENCOUNTER — RX RENEWAL (OUTPATIENT)
Age: 65
End: 2020-10-29

## 2020-11-12 ENCOUNTER — OFFICE VISIT (OUTPATIENT)
Dept: ORTHOPEDIC SURGERY | Age: 65
End: 2020-11-12
Payer: MEDICARE

## 2020-11-12 VITALS — HEIGHT: 68 IN | BODY MASS INDEX: 21.22 KG/M2 | RESPIRATION RATE: 16 BRPM | WEIGHT: 140 LBS

## 2020-11-12 PROCEDURE — G8427 DOCREV CUR MEDS BY ELIG CLIN: HCPCS | Performed by: PHYSICIAN ASSISTANT

## 2020-11-12 PROCEDURE — 3017F COLORECTAL CA SCREEN DOC REV: CPT | Performed by: PHYSICIAN ASSISTANT

## 2020-11-12 PROCEDURE — 4004F PT TOBACCO SCREEN RCVD TLK: CPT | Performed by: PHYSICIAN ASSISTANT

## 2020-11-12 PROCEDURE — 99204 OFFICE O/P NEW MOD 45 MIN: CPT | Performed by: PHYSICIAN ASSISTANT

## 2020-11-12 PROCEDURE — G8420 CALC BMI NORM PARAMETERS: HCPCS | Performed by: PHYSICIAN ASSISTANT

## 2020-11-12 PROCEDURE — G8484 FLU IMMUNIZE NO ADMIN: HCPCS | Performed by: PHYSICIAN ASSISTANT

## 2020-11-12 RX ORDER — MELOXICAM 15 MG/1
TABLET ORAL
COMMUNITY
Start: 2020-11-04 | End: 2021-03-01

## 2020-11-12 RX ORDER — ASPIRIN 81 MG/1
TABLET, CHEWABLE ORAL
COMMUNITY
Start: 2020-06-30

## 2020-11-12 RX ORDER — AMLODIPINE BESYLATE 5 MG/1
5 TABLET ORAL DAILY
COMMUNITY
Start: 2020-10-07

## 2020-11-12 NOTE — PROGRESS NOTES
New Patient: SPINE    CHIEF COMPLAINT:    Chief Complaint   Patient presents with    Back Pain       HISTORY OF PRESENT ILLNESS:                The patient is a 62 y.o. male here for chronic low back and bilateral leg pain. He reports chronic aching low back pain radiating into the left or right posterior thighs, rarely to the calf. Symptoms have been increasing over the last 3 to 4 years. Periodically he will have some numbness and tingling affecting his lower extremities. Symptoms are increased with walking and standing and improved with sitting and resting. Conservative care includes NSAIDs, HEP. He denies any progressive extremity weakness. No recent bowel or bladder dysfunction or saddle anesthesia. No recent injury or trauma. No recent fevers chills or infections. No past medical history on file. Pain Assessment  Location of Pain: Back  Severity of Pain: 4  Quality of Pain: Sharp, Dull, Aching  Duration of Pain: Persistent  Frequency of Pain: Constant  Aggravating Factors: Stairs, Walking, Standing, Squatting, Kneeling, Exercise, Straightening, Stretching, Bending  Limiting Behavior: Yes  Relieving Factors: Rest  Result of Injury: No  Work-Related Injury: No  Are there other pain locations you wish to document?: No    The pain assessment was noted & reviewed in the medical record today. Current/Past Treatment:   · Physical Therapy: HEP  · Chiropractic:     · Injection:     Medications:            NSAIDS: Aleve            Muscle relaxer:              Steriods:              Neuropathic medications:              Opioids:            Other:   · Surgery/Consult: No    Work Status/Functionality: Remodeling homes    Past Medical History: Medical history form was reviewed today & scanned into the media tab  No past medical history on file. Past Surgical History:     No past surgical history on file.   Current Medications:     Current Outpatient Medications:     amLODIPine (NORVASC) 5 MG tablet, TAKE 1 TABLET BY MOUTH EVERY DAY, Disp: , Rfl:     aspirin 81 MG chewable tablet, Take by mouth, Disp: , Rfl:     meloxicam (MOBIC) 15 MG tablet, TAKE 1 TABLET BY MOUTH EVERY DAY, Disp: , Rfl:   Allergies:  Patient has no known allergies. Social History:    reports that he has been smoking. He has never used smokeless tobacco.  Family History:   No family history on file. REVIEW OF SYSTEMS: Full ROS noted & scanned   CONSTITUTIONAL: Denies unexplained weight loss, fevers, chills or fatigue  NEUROLOGICAL: Denies unsteady gait or progressive weakness  MUSCULOSKELETAL: Denies joint swelling or redness  PSYCHOLOGICAL: Denies anxiety, depression   SKIN: Denies skin changes, delayed healing, rash, itching   HEMATOLOGIC: Denies easy bleeding or bruising  ENDOCRINE: Denies excessive thirst, urination, heat/cold  RESPIRATORY: Denies current dyspnea, cough  GI: Denies nausea, vomiting, diarrhea   : Denies bowel or bladder issues       PHYSICAL EXAM:    Vitals: Resp. rate 16, height 5' 8.4\" (1.737 m), weight 140 lb (63.5 kg). GENERAL EXAM:  · General Apparence: Patient is adequately groomed with no evidence of malnutrition. · Orientation: The patient is oriented to time, place and person. · Mood & Affect:The patient's mood and affect are appropriate   · Vascular: Examination reveals no swelling tenderness in upper or lower extremities. Good capillary refill  · Lymphatic: The lymphatic examination bilaterally reveals all areas to be without enlargement or induration  · Sensation: Sensation is intact without deficit  · Coordination/Balance: Good coordination     CERVICAL EXAMINATION:  · Inspection: Local inspection shows no step-off or bruising. Cervical alignment is normal.     · Range of Motion: Intact flexion mild loss of extension  · Strength: 5/5 bilateral upper extremities   · Special Tests:    ·   Spurling's, L'Hermitte's & Ya's negative bilaterally.       · Skin:There are no rashes, ulcerations or any tenderness, deformity or injury. Range of motion is full. There is no gross instability. There are no rashes, ulcerations or lesions.   Strength and tone are normal.    Diagnostic Testing:    Lumbar x-rays films and report independently reviewed June 2020 showing L4-5 spondylolisthesis, severe L5-S1 DDD    2 views lumbar spine flexion extension 11/12/2020 no high-grade instability L4-5 spondylolisthesis        Impression:  1) Chronic LBP, bilateral lumbar radiculitis, neurogenic claudication  2) L4-5 spondy, severe L5-S1 DDD      Plan:   1) PT for above  2) MDP, d/c NSAIDS during  3) F/u 4-6wks, L MRI WO if no improvement   4) Declined LSO          Eliza Johnson  [facility]

## 2020-11-13 RX ORDER — METHYLPREDNISOLONE 4 MG/1
TABLET ORAL
Qty: 1 KIT | Refills: 0 | Status: SHIPPED | OUTPATIENT
Start: 2020-11-13 | End: 2021-03-01

## 2021-01-07 ENCOUNTER — HOSPITAL ENCOUNTER (OUTPATIENT)
Dept: PHYSICAL THERAPY | Age: 66
Setting detail: THERAPIES SERIES
Discharge: HOME | End: 2021-01-07
Payer: MEDICARE

## 2021-01-07 PROCEDURE — 97162 PT EVAL MOD COMPLEX 30 MIN: CPT

## 2021-01-07 PROCEDURE — 97140 MANUAL THERAPY 1/> REGIONS: CPT

## 2021-01-07 PROCEDURE — 97110 THERAPEUTIC EXERCISES: CPT

## 2021-01-07 NOTE — FLOWSHEET NOTE
[facility], [address]  Phone [phone]  Fax [phone]    Outpatient Physical Therapy     [x] Daily Treatment Note   [] Progress Note   [] Discharge Note    Date:  1/7/2021    Patient Name:  Lamar Weiner         YOB: 1955    Medical Diagnosis: Lumbar spondylolysis (M43.06), Lumbar DDD (M51.36), Lumbar radiculitis (M54.16)  Treatment Diagnosis:   Core weakness, limited ROM, pelvic/sacral dysfunction, gait deviations, muscle tightness     Onset Date:  7/1/20  Referral Date: 11/12/20  Referring Physician:  Olayinka Trinidad PA-C     Visits Allowed/Insurance/Certification Information:   [place] - 30 visits/year      Restrictions/Precautions:  HTN, smoker, + spondylolesthesis L4-5    Plan of care sent to provider:   []NA   []Faxed   [x]Co-signature       Plan of care signed:    [x]NA   []Yes   []No      Progress report will be due (10 Rx or 30 days whichever is less): 4/9/51     Recertification will be due (POC Duration  / 90 days whichever is less): 4/7/21    Visit# / total visits:     Visit # Insurance Allowable Auth Required   In Person 1/16 30 []  Yes     [x]  No    [facility] 0  []  Yes     []  No    Total 1/16       Plan for Next Session:  Add sidelying clam shells, hip abd, bridges, prone alt hip ext, prone knee bends, cat/camel, re-assess alignment      Subjective:  See eval     Pain level:   AT EVAL: Patient describes pain to be constant across lumbar region, radiating into buttocks, and down B legs to knee level  Patient reports pain is  5/10 pain at present and  7/10 pain at its worst.  Worsened by:  Extension activities, bending, twisting, lifting >40-50#        Objective:       Exercises:    Exercises in bold performed in department today. Items not bolded are carried forward from prior visits for continuity of the record.   Exercise/Equipment Resistance/Repetitions Issued for Exos pelvic tilt/TA  6 sec/ x10      LE trunk rotation x10      SKTC x5      Supine piriformis stretch 30 sec/ x3      Seated HS stretch 30 sec/ x3                                                                                                    Therapeutic Exercise/Home Exercise Program:   x30 min. Patient educated on eval findings, plan of care, and goals. Instructed in HEP with handout given. Group Therapy:    0 minutes    Therapeutic Activity:  0 minutes     Gait: 0 minutes    Neuromuscular Re-Education:  0 minutes      Canalith Repositioning Procedure:  0 minutes    Manual Therapy:  15 minutes  MET to correct  L anterior pelvic rotation with L on R sacral torsion. Improved alignment noted with MET. Modalities: 0 minutes   [] GAME READY (VASO)- for significant edema, swelling, pain control. Functional Outcome Measure:   []NA  Measure Used:  Oswestry  Date Assessed: 1/7/21  Score: 48%    Assessment/Treatment/Activity Tolerance:  Patient wearing heel lift on L, but left leg found to be longer by 1 cm with measurement. Alignment improved after MET. Instructed patient to d/c heel lift for now as pelvis is level. Will continue to reassess. Patients response to treatment:   [x]Patient tolerated treatment well with no adverse reactions noted    []Patient limited by fatigue   []Patient limited by pain    []Patient limited by other medical complications   [x]Other:  No change in pain noted, but localized to lumbar spine    Goals:   Progress towards goals:  Goals established on 1/7/21  GOALS:  Short Term Goals:2 weeks  1. Independent in HEP and progression per patient tolerance, in order to prevent re-injury. []? Progressing: []? Met: []? Not Met: []? Adjusted            2. Patient will have a decrease in pain to facilitate improvement in movement, function, and ADLs as indicated by Functional Deficits. []? Progressing: []? Met: []? Not Met: []?  Adjusted               Long Term Goals:  8 weeks 1. Disability index score of 20% or less for the oswestry functional questionnaire to assist with reaching prior level of function. []? Progressing: []? Met: []? Not Met: []? Adjusted           2. Patient will demonstrate increased  Lumbar AROM to WNL to allow for proper joint functioning as indicated by patients Functional Deficits. []? Progressing: []? Met: []? Not Met: []? Adjusted            3. Patient will demonstrate an increase in strength to 4+/5 or > to allow for proper functional mobility as indicated by patients Functional Deficits. []? Progressing: []? Met: []? Not Met: []? Adjusted            4. Patient will return to all transfers, work activities, and functional activities without increased symptoms or restriction. []? Progressing: []? Met: []? Not Met: []? Adjusted            5. Patient will have 0-2/10 pain with ADL's.  []? Progressing: []? Met: []? Not Met: []? Adjusted            6. Patient stated goal: Patient will sleep through the night without waking due to pain  []? Progressing: []? Met: []? Not Met: []?  Adjusted              Prognosis: [x]Good   []Fair   []Poor    Patient Requires Follow-up:  [x]Yes  []No    Plan: [x]Plan of care initiated     []Continue per plan of care    [] Alter current plan (see comments)    []Hold pending MD visit []Discharge    Charges:  Timed Code Treatment Minutes: 45   Total Treatment Minutes:  70   [facility] time for each procedure?:  TE TIME:  NMR TIME:  MANUAL TIME:  UNTIMED MINUTES:       [] EVAL (LOW) 35933 (typically 20 minutes face-to-face)  [x] EVAL (MOD) 73887 (typically 30 minutes face-to-face)  [] EVAL (HIGH) 96502 (typically 45 minutes face-to-face)  [] RE-EVAL     [x] QV(92451) x 2    [] IONTO  [] NMR (74074) x     [] VASO  [x] Manual (49110) x1     [] Other:  [] TA x      [] Mech Traction (91708)  [] ES(attended) (49095)     [] ES (un) (49972):        Electronically signed by:  Earline Borjas, PT, MPT [id number] Note: If patient does not return for scheduled/ recommended follow up visits, this note will serve as a discharge from care along with most recent update on progress.

## 2021-01-07 NOTE — PROGRESS NOTES
[address] [place] [address] and Sports Rehabilitation, [facility], [address]  Phone: [phone]                                                    Physical Therapy Certification    We had the pleasure of evaluating the following patient for physical therapy services at [address] [place] [address]. A summary of our findings can be found in the initial assessment below. This includes our plan of care. If you have any questions or concerns regarding these findings, please do not hesitate to contact me at the office phone number checked above.   Thank you for the referral.       Physician Signature:_______________________________Date:__________________  By signing above (or electronic signature), therapists plan is approved by physician    LOWER QUARTER EVALUATION    Patient: Devon Engle   : 1955   MRN: [id number]     Medical Diagnosis: Lumbar spondylolysis (M43.06), Lumbar DDD (M51.36), Lumbar radiculitis (M54.16)  Treatment Diagnosis:   Core weakness, limited ROM, pelvic/sacral dysfunction, gait deviations, muscle tightness     Onset Date:  20  Referral Date: 20  Referring Physician:  Evelin Ortiz PA-C     Visits Allowed/Insurance/Certification Information:   [place] - 30 visits/year      Restrictions/Precautions:  HTN, smoker    C-SSRS Triggered by Intake questionnaire (Past 2 wk assessment):   [x] No, Questionnaire did not trigger screening.   [] Yes, Patient intake triggered further evaluation      [] C-SSRS Screening completed  [] PCP notified via Plan of Care  [] Emergency services notified     Pt's Occupation/Job Duties:  Retired, renovates houses on the side      Social support/Environment:    Family/caregiver support:   [x]Yes, lives with ex-wife   []No    Home Environment:   [x]1 story with basement  []>2 story   [x] RONDA-2  []No rail   []Handrail   14 steps to basement    Health History reviewed with pt:   [x]Yes   []No PMH:  Smoker (1ppd), SOB, HTN, OA     SUBJECTIVE FINDINGS         History of Present Illness:         Pt presents with history of low back pain that started 10 years ago. His pain has been intermittent for the past 7 years. Since July 2020, he has noticed an increase in his back pain that is now constant across lumbar spine, and radiates down his legs. He reports some N/T tingling in the posterior aspects of B legs. 11/12/20 xrays of lumbar spine:   Lumbar x-rays films and report independently reviewed June 2020 showing L4-5 spondylolisthesis, severe L5-S1 DDD   2 views lumbar spine flexion extension 11/12/2020 no high-grade instability L4-5 spondylolisthesis  He was referred for PT treatment and taking Aleve, declined LSO. Had PT treatment in July 2020 with improvement noted. Wears heel lift on L. Pain       Patient describes pain to be constant across lumbar region, radiating into buttocks, and down B legs to knee level  Patient reports pain is  5/10 pain at present and  7/10 pain at its worst.  Worsened by:  Extension activities, bending, twisting, lifting >40-50#    Improved by:  Laying supine or sidelying    Pt. reports pain with coughing, sneezing and laughing:  [x]Yes   []No   []NA   Pt. reports bowel and bladder changes:      []Yes   [x]No   []NA   Pt. reports knee/hip/ankle crepitus, locking, buckling:   [x]Yes   []No   []NA   Having buckling L>R, but has not fallen    Current Functional Limitations:   [x]Yes   []No  Functional complaints:  Unable to run or walk fast, limited with bending, twisting, lifting, kneeling, squatting    PLOF:   [x]No functional limitations   []Pt with previous functional limitations   Pt's sleep is affected?    [x]Yes, having pain at night   []No     Patient goal for therapy:  \"get pain relief\"          OBJECTIVE FINDINGS         Gait/Steps/Balance       []Gait WNL unless otherwise noted below: [x]Deviations on a level linoleum surface include:   Forward flexed posture, forward head, rounded shoulders    [x]Steps WNL (reciprocal pattern with 0-1 rail) unless otherwise noted below:    []Deviations include:     [x]All balance WNL unless otherwise noted below:      Static/ Dynamic Sitting:    Static/Dynamic Standing:  L 10 sec, R 30 sec    Quick Tests/Functional Myotome Tests:   Unilateral sit to stand (L1-3)   []NT  []Able to perform WNL   []Unable to perform     Heel Walk (L4):       []NT  [x]Able to perform WNL   []Unable to perform         Toe Walk (S1):        []NT  [x]Able to perform WNL   []Unable to perform        Posture: [] WNL  [x]Forward head   [x]Forward flexed trunk    []Pronounced CT junction    []Increased thoracic kyphosis   []Increased lumbar lordosis   []Scoliosis   []Swayback   []Decreased WB on   []R   []L     []Other:       Special Tests- standing   (C)= Delgado's Criteria: 3/4 Positive tests or (+) Fortins sign with two additional (+) tests  Special Test Findings   Maikol's Sign                (C)                  [x]Neg   []Pos R   []Pos L   []NT   Standing Landmarks []Iliac Crests Equal   []R High  [x]L High  []PSIS Equal   []R High  []L High  []ASIS Equal   []R High  []L High   []NT  []Difficult to assess due to body habitus    Standing Flexion Test  (C) []Neg   [x]Pos R   []Pos L   []NT   March Test (Gillet's Test) [x]Neg   []Pos R   []Pos L   []NT   Sacral Sulci Test  []Neg   []Pos R   []Pos L   []NT   Cigarette Butt Test:  []Neg   []Pos R   []Pos L   []NT     Lumbar Range of MotionTesting      [x]All WNL except as marked below     ROM (*denotes pain) AROM COMMENTS   Flexion 75*    Extension 15*    Sidebending Left 25*    Sidebending Right 25*    Rotation Left  30* []Seated  [x]Standing       Rotation Right 40* []Seated  [x]Standing         Special Tests- seated     Special Test Findings   Seated pelvic landmarks (C) []Iliac Crests Equal   [x]R High   []L High []PSIS Equal   []R High  []L High  []Difficult to assess due to body habitus   Sitting flexion test  []Neg   []Pos R   [x]Pos L   []NT   Hip IR PROM  [x]WNL []Pos R    []Pos L   []NT        Slump test/Dural tension  []Neg   [x]Pos R   [x]Pos L   []NT       Reflexes     [x]All reflexes WNL (2+) or negative test except as marked below    [x]All strength WNL (5/5) or negative test except as marked below    Reflex Left Right Comments   Ramandeep's Reflex      Biceps (C5,6)      Brachioradialis (C6)      Triceps (C7)      Quadriceps (L3,4)   0 0 []Pendular x 3 R  []Pendular x 3 L   Achilles (S1,2) 0 0    Ankle clonus   []# Beats   Babinski's reflex           Sensation   [x]All dermatomes WNL for light touch     Range of Motion/Strength Testing-Myotomes    [x]All ROM WNL except as marked below    [x]All strength WNL (5/5) except as marked below (measured out of 5)   [x]All  myotomes WNL (5/5) except as marked below (measured out of 5)      Range Tested AROM PROM MMT/Resisted Comments   *denotes pain Left Right Left Right Left Right    Hip Flexion  (L1-2)     4/5 4/5    Hip Extension     3+/5 3+/5    Hip Abduction  (L5)     4/5 3+/5    Hip Adduction  (L3)     2/5 3/5    Hip IR          Hip ER          Knee Flexion  (L5,S1)     4/5 4/5    Knee Extension  (L3,4)     5/5 5/5    Ankle Dorsiflex  (L4)     4/5 4/5    Ankle Plantarflex  (S1,2)     5/5 5/5    Ankle Inversion          Ankle Eversion          Great Toe Ext  (L5)     4/5 4/5        Flexibility     Muscle Findings   Hip flexors/Lokesh  []WNL   []Decreased R   []Decreased L   []NT   Hamstrings  Degrees in 90/90 []WNL   []Decreased R   []Decreased L   []NT  Right:  -35            Left:  -45    Gastrocs []WNL   []Decreased R   []Decreased L   []NT   Obers/TFL/ITB []WNL   []Decreased R   []Decreased L   []NT   Piriformis  []WNL   [x]Decreased R   [x]Decreased L   []NT   Other:  []WNL   []Decreased R   []Decreased L   []NT Special Tests Lumbosacral and hip- supine/sidelying/prone    (L) = Laslett's Criteria: 2 positive tests  Special Test Findings   Sit up test/ Supine Long sit test  (C) []Neg   [x]Pos R   []Pos L   []NT  Comments:  R lengthens   SI distraction                               (L) []Neg   []Pos   []NT   Thigh Thrust test                         (L) [x]Neg   []Pos R   []Pos L   []NT   90/90 test  []Neg   []Pos R   []Pos L   []NT   Gaenslen's test []Neg   []Pos R   []Pos L   []NT   Straight Leg Raise [x]Neg   []Pos R   []Pos L   []NT   Crams []Neg   []Pos R   []Pos L   []NT   Lumbar Distraction  []Relief noted   []No relief noted  []Rebound pain   []NT   Hip scour [x]Neg   []Pos R   []Pos L   []NT   Kane's test [x]Neg   []Pos R   []Pos L   []NT   Jose's test []Neg   []Pos R   []Pos L   []NT   Oscillation []Neg   []Pos R   []Pos L   []NT   Ant/Post Provocation  []Neg   []Pos R   []Pos L   []NT   SI compression                           (L) []Neg   []Pos   []NT   Prone knee flexion test               (C) [x]Neg   []Pos R   []Pos L   []NT  Comments:    Femoral nerve tension test []Neg   []Pos R   []Pos L   []NT   Pheasant test []Neg   []Pos R   []Pos L   []NT   Sacral thrusts                              (L) []Neg   []Pos   []NT  []Base   []Hanna   []R Sacral Sulcus  []L Sacral Sulcus   []R RYNE   []L RYNE     Palpation   L anterior pelvic rotation with L on R sacral torsion  Patient reported tenderness with palpation:  [x]Yes   []No   []NT  Location:  B SI joints, B lumbar paraspinals, along SP L1-5, B piriformis  PT notes warmth:  []Yes   [x]No   []NT  Location:   PT notes increased muscle tone:   [x]Yes   []No   []NT  Location:  B hamstrings, piriformis, quads  PT notes crepitus with palpation:   []Yes   [x]No   []NT   Location:     Girth Measurements (cm)        Location Left Right Location Left Right   2\" above mid patella   3\" inferior to inferior patellar pole Mid patella   6\" inferior to inferior patellar pole     2\" below mid patella   Malleoli     Inferior patellar pole   Figure 8     Leg length 93.5 92.5 Met heads        Co-morbidities/Complexities (which will affect course of rehabilitation):  []None           Arthritic conditions   []Rheumatoid arthritis (M05.9)  [x]Osteoarthritis (M19.91)   Cardiovascular conditions   [x]Hypertension (I10)  []Hyperlipidemia (E78.5)  []Angina pectoris (I20)  []Atherosclerosis (I70)   Musculoskeletal conditions   []Disc pathology   []Congenital spine pathologies   []Prior surgical intervention  []Osteoporosis (M81.8)  []Osteopenia (M85.8)   Endocrine conditions   []Hypothyroid (E03.9)  []Hyperthyroid Gastrointestinal conditions   []Constipation (P33.69)   Metabolic conditions   []Morbid obesity (E66.01)  []Diabetes type 1(E10.65) or 2 (E11.65)   []Neuropathy (G60.9)     Pulmonary conditions   []Asthma (J45)  []Coughing   []COPD (J44.9)   Psychological Disorders  []Anxiety (F41.9)  []Depression (F32.9)   []Other:   []Other:          Functional Outcome Measure:   []NA  Measure Used: Oswestry  Date Assessed:  1/7/21  Score: 48%    ASSESSMENT: Patient presents with s/s consistent with Dx with pelvic/sacral dysfunction noted. Recommend PT treatment to address problem list so that the patient may return to regular activities without any functional limitations noted.    Functional Impairments:     []Noted lumbar/proximal hip/LE joint hypomobility   [x]Decreased LE functional ROM   [x]Decreased core/proximal hip strength and neuromuscular control   [x]Decreased LE functional strength   []Reduced balance/proprioceptive control   [x]other:  Gait impairments    Functional Activity Limitations (from functional questionnaire and intake)   [x]Reduced ability to tolerate prolonged functional positions   [x]Reduced ability or difficulty with changes of positions or transfers between positions [x]Reduced ability to maintain good posture and demonstrate good body mechanics with sitting, bending, and lifting   [x]Reduced ability to sleep   [] Reduced ability or tolerance with driving and/or computer work   [x]Reduced ability to perform lifting, carrying tasks   [x]Reduced ability to squat   [x]Reduced ability to forward bend   [x]Reduced ability to ambulate prolonged functional periods/distances/surfaces   [x]Reduced ability to ascend/descend stairs   [x]Reduced ability to run, hop, cut or jump   []other:    Participation Restrictions   []Reduced participation in self care activities   []Reduced participation in home management activities   []Reduced participation in work activities   [x]Reduced participation in social activities. []Reduced participation in sport/recreation activities. Classification :    []Signs/symptoms consistent with post-surgical status including decreased ROM, strength and function. []Signs/symptoms consistent with joint sprain/strain   []Signs/symptoms consistent with patella-femoral syndrome   []Signs/symptoms consistent with knee OA/hip OA   []Signs/symptoms consistent with internal derangement of knee/Hip   [x]Signs/symptoms consistent with functional hip weakness/NMR control      []Signs/symptoms consistent with tendinitis/tendinosis    []signs/symptoms consistent with pathology which may benefit from Dry needling      [x]other:  Pelvic/sacral dysfunction, core weakness    Rehabilitation Potential:  Good for goals listed below. Strengths for achieving goals include:   [x]Pt motivated   [x]PLOF   []Family support   Limitations for achieving goals include: []Pain  []Severity of condition     []Cognitive   []Lack of family support   []Lack of resources     []Other:         Prognosis:   [x]Good   []Fair   []Poor    GOALS:  Short Term Goals:2 weeks  1. Independent in HEP and progression per patient tolerance, in order to prevent re-injury. [] Progressing: [] Met: [] Not Met: [] Adjusted   2. Patient will have a decrease in pain to facilitate improvement in movement, function, and ADLs as indicated by Functional Deficits. [] Progressing: [] Met: [] Not Met: [] Adjusted     Long Term Goals:  8 weeks  1. Disability index score of 20% or less for the oswestry functional questionnaire to assist with reaching prior level of function. [] Progressing: [] Met: [] Not Met: [] Adjusted   2. Patient will demonstrate increased  Lumbar AROM to WNL to allow for proper joint functioning as indicated by patients Functional Deficits. [] Progressing: [] Met: [] Not Met: [] Adjusted   3. Patient will demonstrate an increase in strength to 4+/5 or > to allow for proper functional mobility as indicated by patients Functional Deficits. [] Progressing: [] Met: [] Not Met: [] Adjusted   4. Patient will return to all transfers, work activities, and functional activities without increased symptoms or restriction. [] Progressing: [] Met: [] Not Met: [] Adjusted   5. Patient will have 0-2/10 pain with ADL's.  [] Progressing: [] Met: [] Not Met: [] Adjusted   6. Patient stated goal: Patient will sleep through the night without waking due to pain  [] Progressing: [] Met: [] Not Met: [] Adjusted     PLAN OF CARE     To see patient  1-2 x/week for 8  weeks for the following treatment interventions:  [x]Therapeutic Exercise  [x]Modalities of Choice:   []Heat   []Cold   []US   []Estim   []Ionto  []Mechanical Traction   [x]Manual Therapy  [x]Neuromuscular Re-Education  [x]Gait Training   [x]Therapeutic Activity  []Other     Physical Therapy Evaluation Complexity Justification  [] EVAL (LOW) 85330 (typically 20 minutes face-to-face)  [x] EVAL (MOD) 06170 (typically 30 minutes face-to-face)  [] EVAL (HIGH) 66974 (typically 45 minutes face-to-face)  [] RE-EVAL     Thank you for the referral of this patient.       Timed Code Treatment Minutes:  45   minutes Total Treatment Time:  70  minutes    Vineet Moore PT , MPT [id number]

## 2021-01-12 ENCOUNTER — HOSPITAL ENCOUNTER (OUTPATIENT)
Dept: PHYSICAL THERAPY | Age: 66
Setting detail: THERAPIES SERIES
Discharge: HOME | End: 2021-01-12
Payer: MEDICARE

## 2021-01-12 PROCEDURE — 97110 THERAPEUTIC EXERCISES: CPT

## 2021-01-12 NOTE — FLOWSHEET NOTE
[facility], [address] [place] [address]  Phone [phone]  Fax [phone]    Outpatient Physical Therapy     [x] Daily Treatment Note   [] Progress Note   [] Discharge Note    Date:  1/12/2021    Patient Name:  Carlos Diop"       YOB: 1955    Medical Diagnosis: Lumbar spondylolysis (M43.06), Lumbar DDD (M51.36), Lumbar radiculitis (M54.16)  Treatment Diagnosis:   Core weakness, limited ROM, pelvic/sacral dysfunction, gait deviations, muscle tightness     Onset Date:  7/1/20  Referral Date: 11/12/20  Referring Physician:  Rock Yumiko PA-C     Visits Allowed/Insurance/Certification Information:   [place] - 30 visits/year      Restrictions/Precautions:  HTN, smoker, + spondylolesthesis L4-5    Plan of care sent to provider:   []NA   []Faxed   [x]Co-signature       Plan of care signed:    []NA   [x]Yes 1/7/21  []No      Progress report will be due (10 Rx or 30 days whichever is less): 8/5/29     Recertification will be due (POC Duration  / 90 days whichever is less): 4/7/21    Visit# / total visits:     Visit # Insurance Allowable Auth Required   In Person 2/16 30 []  Yes     [x]  No    [facility] 0  []  Yes     []  No    Total 2/16       Plan for Next Session:  Add  prone alt hip ext, prone knee bends, cat/camel, multifidus handshake, re-assess alignment      Subjective:  No significant changes since last visit. Was sore Friday after eval, but then felt better over the weekend when he did his exercises.        Pain level:  5-6/10 across lumbar spine, not radiating into LE's   AT EVAL: Patient describes pain to be constant across lumbar region, radiating into buttocks, and down B legs to knee level  Patient reports pain is  5/10 pain at present and  7/10 pain at its worst.  Worsened by:  Extension activities, bending, twisting, lifting >40-50#        Objective: Exercises:    Exercises in bold performed in department today. Items not bolded are carried forward from prior visits for continuity of the record. Exercise/Equipment Resistance/Repetitions Issued for HEP    pelvic tilt/TA  6 sec/ x10      LE trunk rotation x10      SKTC x5      Supine piriformis stretch 30 sec/ x3  (manual)      Seated HS stretch 30 sec/ x3  (manual)      Standing heel toe raises x15 B      Standing high marches x15 B     standing hip abd  x15 B    Standing hip ext x15 B        sidelying clam shells x15 B       sidelying hip abd x15 B      bridges x15 B                                                Therapeutic Exercise/Home Exercise Program:   x45 min. Reviewed and progressed exercises as noted above with new handout given. Instructed in body mechanics for lifting and household chores. Group Therapy:    0 minutes    Therapeutic Activity:  0 minutes     Gait: 0 minutes    Neuromuscular Re-Education:  0 minutes      Canalith Repositioning Procedure:  0 minutes    Manual Therapy:  0 minutes    Neutral alignment noted without MET needed today. Modalities: 0 minutes   [] GAME READY (VASO)- for significant edema, swelling, pain control. Functional Outcome Measure:   []NA  Measure Used:  Oswestry  Date Assessed: 1/7/21  Score: 48%    Assessment/Treatment/Activity Tolerance:    Patients response to treatment:   [x]Patient tolerated treatment well with no adverse reactions noted    []Patient limited by fatigue   []Patient limited by pain    []Patient limited by other medical complications   [x]Other:  Pain decreased to 3/10 after treatment, still localized to spine. Goals:   Progress towards goals:  Goals established on 1/7/21  GOALS:  Short Term Goals:2 weeks  1. Independent in HEP and progression per patient tolerance, in order to prevent re-injury. []? Progressing: []? Met: []? Not Met: []?  Adjusted [] Manual (97411) x     [] Other:  [] TA x      [] Mech Traction (28512)  [] ES(attended) (56415)     [] ES (un) (45300):        Electronically signed by:  Reginold Goldberg, PT, MPT [id number]    Note: If patient does not return for scheduled/ recommended follow up visits, this note will serve as a discharge from care along with most recent update on progress.

## 2021-01-15 ENCOUNTER — HOSPITAL ENCOUNTER (OUTPATIENT)
Dept: PHYSICAL THERAPY | Age: 66
Setting detail: THERAPIES SERIES
Discharge: HOME | End: 2021-01-15
Payer: MEDICARE

## 2021-01-15 PROCEDURE — 97110 THERAPEUTIC EXERCISES: CPT

## 2021-01-15 NOTE — FLOWSHEET NOTE
[address] [facility], [address] [place] [address]  Phone [phone]  Fax [phone]    Outpatient Physical Therapy     [x] Daily Treatment Note   [] Progress Note   [] Discharge Note    Date:  1/15/2021    Patient Name:  Светлана Snell"       YOB: 1955    Medical Diagnosis: Lumbar spondylolysis (M43.06), Lumbar DDD (M51.36), Lumbar radiculitis (M54.16)  Treatment Diagnosis:   Core weakness, limited ROM, pelvic/sacral dysfunction, gait deviations, muscle tightness     Onset Date:  7/1/20  Referral Date: 11/12/20  Referring Physician:  Adrien Nuno PA-C     Visits Allowed/Insurance/Certification Information:   [place] - 30 visits/year      Restrictions/Precautions:  HTN, smoker, + spondylolesthesis L4-5    Plan of care sent to provider:   []NA   []Faxed   [x]Co-signature       Plan of care signed:    []NA   [x]Yes 1/7/21  []No      Progress report will be due (10 Rx or 30 days whichever is less): 1/0/84     Recertification will be due (POC Duration  / 90 days whichever is less): 4/7/21    Visit# / total visits:     Visit # Insurance Allowable Auth Required   In Person 3/16 30 []  Yes     [x]  No    [facility] 0  []  Yes     []  No    Total 3/16       Plan for Next Session:  Add  prone knee bends, cat/camel, reverse crunch with ball, bridges on ball, lateral rolls      Subjective:  Doing well. He feels his endurance is improving. He is exercising on his total gym    Performing HEP daily. Still having stiffness and pain after working a lot. Occasionally wakes due to pain.         Pain level:  5-6/10 across lumbar spine, not radiating into LE's, 8/10 at worst   AT EVAL: Patient describes pain to be constant across lumbar region, radiating into buttocks, and down B legs to knee level  Patient reports pain is  5/10 pain at present and  7/10 pain at its worst. Worsened by:  Extension activities, bending, twisting, lifting >40-50#        Objective:       Exercises:    Exercises in bold performed in department today. Items not bolded are carried forward from prior visits for continuity of the record. Exercise/Equipment Resistance/Repetitions Issued for HEP    pelvic tilt/TA  6 sec/ x10      LE trunk rotation x10      SKTC x5      Supine piriformis stretch 30 sec/ x3  (manual)      Seated HS stretch 30 sec/ x3  (manual)      Standing heel toe raises x15 B      Standing high marches x15 B     standing hip abd  x15 B    Standing hip ext x15 B        sidelying clam shells x15 B added green tband       sidelying hip abd x20 B, 1#      Bridges (with green tband around knees, up/out/together/down) x15 B added green tband      nustep L5, x5 min      Multifidus handshake  2#, 3x10      TA with march x15 B      Prone alt LE lifts x10 B                     Therapeutic Exercise/Home Exercise Program:   x45 min. Reviewed and progressed exercises as noted above with new handout given. Group Therapy:    0 minutes    Therapeutic Activity:  0 minutes     Gait: 0 minutes    Neuromuscular Re-Education:  0 minutes      Canalith Repositioning Procedure:  0 minutes    Manual Therapy:  0 minutes    Neutral alignment noted without MET needed today. Modalities: 0 minutes   [] GAME READY (VASO)- for significant edema, swelling, pain control. Functional Outcome Measure:   []NA  Measure Used:  Oswestry  Date Assessed: 1/7/21  Score: 48%    Assessment/Treatment/Activity Tolerance:    Patients response to treatment:   [x]Patient tolerated treatment well with no adverse reactions noted    []Patient limited by fatigue   []Patient limited by pain    []Patient limited by other medical complications   [x]Other:  Pain decreased to 3/10 after treatment, still localized to spine.     Goals:   Progress towards goals:  Goals established on 1/7/21  GOALS:  Short Term Goals:2 weeks 1. Independent in HEP and progression per patient tolerance, in order to prevent re-injury. []? Progressing: []? Met: []? Not Met: []? Adjusted            2. Patient will have a decrease in pain to facilitate improvement in movement, function, and ADLs as indicated by Functional Deficits. []? Progressing: []? Met: []? Not Met: []? Adjusted               Long Term Goals:  8 weeks  1. Disability index score of 20% or less for the oswestry functional questionnaire to assist with reaching prior level of function. []? Progressing: []? Met: []? Not Met: []? Adjusted           2. Patient will demonstrate increased  Lumbar AROM to WNL to allow for proper joint functioning as indicated by patients Functional Deficits. []? Progressing: []? Met: []? Not Met: []? Adjusted            3. Patient will demonstrate an increase in strength to 4+/5 or > to allow for proper functional mobility as indicated by patients Functional Deficits. []? Progressing: []? Met: []? Not Met: []? Adjusted            4. Patient will return to all transfers, work activities, and functional activities without increased symptoms or restriction. []? Progressing: []? Met: []? Not Met: []? Adjusted            5. Patient will have 0-2/10 pain with ADL's.  []? Progressing: []? Met: []? Not Met: []? Adjusted            6. Patient stated goal: Patient will sleep through the night without waking due to pain  []? Progressing: []? Met: []? Not Met: []?  Adjusted              Prognosis: [x]Good   []Fair   []Poor    Patient Requires Follow-up:  [x]Yes  []No    Plan: []Plan of care initiated     [x]Continue per plan of care    [] Alter current plan (see comments)    []Hold pending MD visit []Discharge    Charges:  Timed Code Treatment Minutes: 45   Total Treatment Minutes:  45   [facility] time for each procedure?:  TE TIME:  NMR TIME:  MANUAL TIME:  UNTIMED MINUTES:       [] EVAL (LOW) 33934 (typically 20 minutes face-to-face) [] EVAL (MOD) 74737 (typically 30 minutes face-to-face)  [] EVAL (HIGH) 45129 (typically 45 minutes face-to-face)  [] RE-EVAL     [x] MG(82845) x 3    [] IONTO  [] NMR (61657) x     [] VASO  [] Manual (92608) x     [] Other:  [] TA x      [] Mech Traction (22854)  [] ES(attended) (45124)     [] ES (un) (35231):        Electronically signed by:  Ny Bolivar, PT, MPT [id number]    Note: If patient does not return for scheduled/ recommended follow up visits, this note will serve as a discharge from care along with most recent update on progress.

## 2021-01-19 ENCOUNTER — HOSPITAL ENCOUNTER (OUTPATIENT)
Dept: PHYSICAL THERAPY | Age: 66
Setting detail: THERAPIES SERIES
Discharge: HOME | End: 2021-01-19
Payer: MEDICARE

## 2021-01-19 PROCEDURE — 97110 THERAPEUTIC EXERCISES: CPT

## 2021-01-19 NOTE — FLOWSHEET NOTE
[address] [facility], [address] [place] [address]  Phone [phone]  Fax [phone]    Outpatient Physical Therapy     [x] Daily Treatment Note   [] Progress Note   [] Discharge Note    Date:  1/19/2021    Patient Name:  bAy Ventura"       YOB: 1955    Medical Diagnosis: Lumbar spondylolysis (M43.06), Lumbar DDD (M51.36), Lumbar radiculitis (M54.16)  Treatment Diagnosis:   Core weakness, limited ROM, pelvic/sacral dysfunction, gait deviations, muscle tightness     Onset Date:  7/1/20  Referral Date: 11/12/20  Referring Physician:  Lina Chicas PA-C     Visits Allowed/Insurance/Certification Information:   [place] - 30 visits/year      Restrictions/Precautions:  HTN, smoker, + spondylolesthesis L4-5    Plan of care sent to provider:   []NA   []Faxed   [x]Co-signature       Plan of care signed:    []NA   [x]Yes 1/7/21  []No      Progress report will be due (10 Rx or 30 days whichever is less): 9/1/06     Recertification will be due (POC Duration  / 90 days whichever is less): 4/7/21    Visit# / total visits:     Visit # Insurance Allowable Auth Required   In Person 4/16 30 []  Yes     [x]  No    [facility] 0  []  Yes     []  No    Total 4/16       Plan for Next Session:  Add cat/camel, reverse crunch with ball, bridges on ball, lateral rolls      Subjective:  Patient states that he had a bad day on Sunday with reports of low back pain 7-8/10, and the pain was radiating down his legs. The pain persisted until Monday. Feeling better today. He reports his back feels less stable now than it did before starting treatment, especially with turns. Has pain at night with rolling over in bed. Has not been working as much due to his pain.             Pain level:  4/10 across lumbar spine, not radiating into LE's, 8/10 at worst AT EVAL: Patient describes pain to be constant across lumbar region, radiating into buttocks, and down B legs to knee level  Patient reports pain is  5/10 pain at present and  7/10 pain at its worst.  Worsened by:  Extension activities, bending, twisting, lifting >40-50#        Objective:       Exercises:    Exercises in bold performed in department today. Items not bolded are carried forward from prior visits for continuity of the record. Exercise/Equipment Resistance/Repetitions Issued for HEP    pelvic tilt/TA  6 sec/ x10      LE trunk rotation x10      SKTC x5      Supine piriformis stretch 30 sec/ x3  (manual)      Seated HS stretch 30 sec/ x3  (manual)      Standing heel toe raises x15 B      Standing high marches x25 B  1#     standing hip abd  x15 B    Standing hip ext x15 B        sidelying clam shells x25 B added blue tband       sidelying hip abd x25 B, 1#      Bridges (with tband around knees, up/out/together/down) x20 B added blue tband      nustep L6, x6 min      Multifidus handshake  2#, 3x10      TA with march x20 B  Blue tband around knees      Prone alt LE lifts x20 B 1#       Prone knee bends X20, B  1#      Supine SLR X20, B 1#      Prone quad stretch (manual) 30 sec/ x3    Lat pull down 3 plates, [id number]                                              Therapeutic Exercise/Home Exercise Program:   x45 min. Reviewed and progressed exercises as noted above. Group Therapy:    0 minutes    Therapeutic Activity:  0 minutes     Gait: 0 minutes    Neuromuscular Re-Education:  0 minutes      Canalith Repositioning Procedure:  0 minutes    Manual Therapy:  0 minutes    Neutral alignment noted without MET needed today. Modalities: 0 minutes   [] GAME READY (VASO)- for significant edema, swelling, pain control.      Functional Outcome Measure:   []NA  Measure Used:  Oswestry  Date Assessed: 1/7/21  Score: 48%    Assessment/Treatment/Activity Tolerance:    Patients response to treatment: [x]Patient tolerated treatment well with no adverse reactions noted    []Patient limited by fatigue   []Patient limited by pain    []Patient limited by other medical complications   [x]Other:  Pain decreased to 1-2/10 after treatment, still localized to spine. Goals:   Progress towards goals:  Goals established on 1/7/21  GOALS:  Short Term Goals:2 weeks  1. Independent in HEP and progression per patient tolerance, in order to prevent re-injury. []? Progressing: []? Met: []? Not Met: []? Adjusted            2. Patient will have a decrease in pain to facilitate improvement in movement, function, and ADLs as indicated by Functional Deficits. []? Progressing: []? Met: []? Not Met: []? Adjusted               Long Term Goals:  8 weeks  1. Disability index score of 20% or less for the oswestry functional questionnaire to assist with reaching prior level of function. []? Progressing: []? Met: []? Not Met: []? Adjusted           2. Patient will demonstrate increased  Lumbar AROM to WNL to allow for proper joint functioning as indicated by patients Functional Deficits. []? Progressing: []? Met: []? Not Met: []? Adjusted            3. Patient will demonstrate an increase in strength to 4+/5 or > to allow for proper functional mobility as indicated by patients Functional Deficits. []? Progressing: []? Met: []? Not Met: []? Adjusted            4. Patient will return to all transfers, work activities, and functional activities without increased symptoms or restriction. []? Progressing: []? Met: []? Not Met: []? Adjusted            5. Patient will have 0-2/10 pain with ADL's.  []? Progressing: []? Met: []? Not Met: []? Adjusted            6. Patient stated goal: Patient will sleep through the night without waking due to pain  []? Progressing: []? Met: []? Not Met: []?  Adjusted              Prognosis: [x]Good   []Fair   []Poor    Patient Requires Follow-up:  [x]Yes  []No Plan: []Plan of care initiated     [x]Continue per plan of care    [] Alter current plan (see comments)    []Hold pending MD visit []Discharge    Charges:  Timed Code Treatment Minutes: 45   Total Treatment Minutes:  45   [address] [facility] time for each procedure?:  TE TIME:  NMR TIME:  MANUAL TIME:  UNTIMED MINUTES:       [] EVAL (LOW) 33330 (typically 20 minutes face-to-face)  [] EVAL (MOD) 71583 (typically 30 minutes face-to-face)  [] EVAL (HIGH) 92896 (typically 45 minutes face-to-face)  [] RE-EVAL     [x] JH(65393) x 3    [] IONTO  [] NMR (23721) x     [] VASO  [] Manual (22083) x     [] Other:  [] TA x      [] Mech Traction (88485)  [] ES(attended) (15476)     [] ES (un) (15594):        Electronically signed by:  Becka Mesa PT, MPT [id number]    Note: If patient does not return for scheduled/ recommended follow up visits, this note will serve as a discharge from care along with most recent update on progress.

## 2021-01-20 ENCOUNTER — RX RENEWAL (OUTPATIENT)
Age: 66
End: 2021-01-20

## 2021-01-21 ENCOUNTER — HOSPITAL ENCOUNTER (OUTPATIENT)
Dept: PHYSICAL THERAPY | Age: 66
Setting detail: THERAPIES SERIES
Discharge: HOME | End: 2021-01-21
Payer: MEDICARE

## 2021-01-21 PROCEDURE — 97110 THERAPEUTIC EXERCISES: CPT

## 2021-01-21 NOTE — FLOWSHEET NOTE
[address] [facility], [address] [place] [address]  Phone [phone]  Fax [phone]    Outpatient Physical Therapy     [x] Daily Treatment Note   [] Progress Note   [] Discharge Note    Date:  1/21/2021    Patient Name:  Antonette Daley"       YOB: 1955    Medical Diagnosis: Lumbar spondylolysis (M43.06), Lumbar DDD (M51.36), Lumbar radiculitis (M54.16)  Treatment Diagnosis:   Core weakness, limited ROM, pelvic/sacral dysfunction, gait deviations, muscle tightness     Onset Date:  7/1/20  Referral Date: 11/12/20  Referring Physician:  Kassandra Haines PA-C     Visits Allowed/Insurance/Certification Information:   [place] - 30 visits/year      Restrictions/Precautions:  HTN, smoker, + spondylolesthesis L4-5    Plan of care sent to provider:   []NA   []Faxed   [x]Co-signature       Plan of care signed:    []NA   [x]Yes 1/7/21  []No      Progress report will be due (10 Rx or 30 days whichever is less): 5/9/30     Recertification will be due (POC Duration  / 90 days whichever is less): 4/7/21    Visit# / total visits:     Visit # Insurance Allowable Auth Required   In Person 5/16 30 []  Yes     [x]  No    [facility] 0  []  Yes     []  No    Total 5/16       Plan for Next Session:  Add cat/camel, reverse crunch with ball, bridges on ball, lateral rolls      Subjective:  Patient states that he had a good day yesterday, pain 3-4/10 most of the day. Did not do much activity and did HEP. Woke up with a lot of pain this, but thinks it is due to falling asleep on the couch last night. Has a lot of pain getting out of his truck after driving.             Pain level:  5-6/10 across lumbar spine, not radiating into LE's, 7-8/10 at worst   AT EVAL: Patient describes pain to be constant across lumbar region, radiating into buttocks, and down B legs to knee level Patient reports pain is  5/10 pain at present and  7/10 pain at its worst.  Worsened by:  Extension activities, bending, twisting, lifting >40-50#        Objective:       Exercises:    Exercises in bold performed in department today. Items not bolded are carried forward from prior visits for continuity of the record. Exercise/Equipment Resistance/Repetitions Issued for HEP    pelvic tilt/TA  6 sec/ x10      LE trunk rotation x10      SKTC x5      Supine piriformis stretch 30 sec/ x3  (manual)      Seated HS stretch 30 sec/ x3  (manual)      Standing heel toe raises x15 B      Standing high marches x25 B  2#     standing hip abd  x15 B    Standing hip ext x15 B        sidelying clam shells x25 B  blue tband       sidelying hip abd x25 B, 2#      Bridges (with tband around knees, up/out/together/down) x25 B  blue tband      nustep L6, x6 min      Multifidus handshake  2#, 3x10      TA with march x20 B  Blue tband around knees      Prone alt LE lifts x25 B        Prone knee bends X30, B  2#      Supine SLR X30, B 2#      Prone quad stretch (manual) 30 sec/ x3    Lat pull down 3 plates, [id number]                                              Therapeutic Exercise/Home Exercise Program:   x45 min. Reviewed and progressed exercises as noted above. Group Therapy:    0 minutes    Therapeutic Activity:  0 minutes     Gait: 0 minutes    Neuromuscular Re-Education:  0 minutes      Canalith Repositioning Procedure:  0 minutes    Manual Therapy:  0 minutes    Neutral alignment noted without MET needed today. Modalities: 0 minutes   [] GAME READY (VASO)- for significant edema, swelling, pain control.      Functional Outcome Measure:   []NA  Measure Used:  Oswestry  Date Assessed: 1/7/21  Score: 48%    Assessment/Treatment/Activity Tolerance:    Patients response to treatment:   [x]Patient tolerated treatment well with no adverse reactions noted    []Patient limited by fatigue   []Patient limited by pain []Patient limited by other medical complications   [x]Other:  Slight decrease in pain noted after treatment 4-5/10. Goals:   Progress towards goals:  Goals established on 1/7/21  GOALS:  Short Term Goals:2 weeks  1. Independent in HEP and progression per patient tolerance, in order to prevent re-injury. []? Progressing: []? Met: []? Not Met: []? Adjusted            2. Patient will have a decrease in pain to facilitate improvement in movement, function, and ADLs as indicated by Functional Deficits. []? Progressing: []? Met: []? Not Met: []? Adjusted               Long Term Goals:  8 weeks  1. Disability index score of 20% or less for the oswestry functional questionnaire to assist with reaching prior level of function. []? Progressing: []? Met: []? Not Met: []? Adjusted           2. Patient will demonstrate increased  Lumbar AROM to WNL to allow for proper joint functioning as indicated by patients Functional Deficits. []? Progressing: []? Met: []? Not Met: []? Adjusted            3. Patient will demonstrate an increase in strength to 4+/5 or > to allow for proper functional mobility as indicated by patients Functional Deficits. []? Progressing: []? Met: []? Not Met: []? Adjusted            4. Patient will return to all transfers, work activities, and functional activities without increased symptoms or restriction. []? Progressing: []? Met: []? Not Met: []? Adjusted            5. Patient will have 0-2/10 pain with ADL's.  []? Progressing: []? Met: []? Not Met: []? Adjusted            6. Patient stated goal: Patient will sleep through the night without waking due to pain  []? Progressing: []? Met: []? Not Met: []?  Adjusted              Prognosis: [x]Good   []Fair   []Poor    Patient Requires Follow-up:  [x]Yes  []No    Plan: []Plan of care initiated     [x]Continue per plan of care    [] Alter current plan (see comments)    []Hold pending MD visit []Discharge    Charges: Timed Code Treatment Minutes: 45   Total Treatment Minutes:  45   [facility] time for each procedure?:  TE TIME:  NMR TIME:  MANUAL TIME:  UNTIMED MINUTES:       [] EVAL (LOW) 47675 (typically 20 minutes face-to-face)  [] EVAL (MOD) 64448 (typically 30 minutes face-to-face)  [] EVAL (HIGH) 67450 (typically 45 minutes face-to-face)  [] RE-EVAL     [x] ZB(09160) x 3    [] IONTO  [] NMR (38851) x     [] VASO  [] Manual (18109) x     [] Other:  [] TA x      [] Mech Traction (51263)  [] ES(attended) (72651)     [] ES (un) (83025):        Electronically signed by:  Noelle Vega, PT, MPT [id number]    Note: If patient does not return for scheduled/ recommended follow up visits, this note will serve as a discharge from care along with most recent update on progress.

## 2021-01-26 ENCOUNTER — HOSPITAL ENCOUNTER (OUTPATIENT)
Dept: PHYSICAL THERAPY | Age: 66
Setting detail: THERAPIES SERIES
Discharge: HOME | End: 2021-01-26
Payer: MEDICARE

## 2021-01-26 PROCEDURE — 97110 THERAPEUTIC EXERCISES: CPT

## 2021-01-26 NOTE — FLOWSHEET NOTE
[address] [place] [address] and Sports Rehabilitation, [address]  Phone [phone]  Fax [phone]    Outpatient Physical Therapy     [x] Daily Treatment Note   [] Progress Note   [] Discharge Note    Date:  1/26/2021    Patient Name:  Deng Guy"       YOB: 1955    Medical Diagnosis: Lumbar spondylolysis (M43.06), Lumbar DDD (M51.36), Lumbar radiculitis (M54.16)  Treatment Diagnosis:   Core weakness, limited ROM, pelvic/sacral dysfunction, gait deviations, muscle tightness     Onset Date:  7/1/20  Referral Date: 11/12/20  Referring Physician:  Arminda Coates PA-C     Visits Allowed/Insurance/Certification Information:   [place] - 30 visits/year      Restrictions/Precautions:  HTN, smoker, + spondylolesthesis L4-5    Plan of care sent to provider:   []NA   []Faxed   [x]Co-signature       Plan of care signed:    []NA   [x]Yes 1/7/21  []No      Progress report will be due (10 Rx or 30 days whichever is less): 1/0/51     Recertification will be due (POC Duration  / 90 days whichever is less): 4/7/21    Visit# / total visits:     Visit # Insurance Allowable Auth Required   In Person 6/16 30 []  Yes     [x]  No    [facility] 0  []  Yes     []  No    Total 6/16       Plan for Next Session:         Subjective:  Patient reports his pain does not vary to much. Reports he feels stiff too. Rolling over in bed at night wakes him. Feels really stiff after 20' drive.   Pt reports he is now doing a seated fig 4 piriformis stretch         Pain level:  5-6/10 across lumbar spine, not radiating into LE's, 7-8/10 at worst   AT EVAL: Patient describes pain to be constant across lumbar region, radiating into buttocks, and down B legs to knee level  Patient reports pain is  5/10 pain at present and  7/10 pain at its worst.  Worsened by:  Extension activities, bending, twisting, lifting >40-50#        Objective: Exercises:    Exercises in bold performed in department today. Items not bolded are carried forward from prior visits for continuity of the record. Exercise/Equipment Resistance/Repetitions Issued for HEP   pelvic tilt/TA  6 sec/ x10 x   LE trunk rotation x10 x   SKTC x5 x   Supine piriformis stretch 30 sec/ x3  (manual) x   Seated HS stretch 30 sec/ x3  (manual) x   Standing heel toe raises x15 B x   Standing high marches x25 B  2# x   standing hip abd  x15 B x   Standing hip ext x15 B x   sidelying clam shells x25 B  blue tband x   sidelying hip abd x25 B, 2# x   Bridges (with tband around knees, up/out/together/down) x25 B  blue tband x     nustep S10 L6, x6 min    Multifidus handshake  2#, 3x10 x   TA with march x20 B  Blue tband around knees x   Prone alt LE lifts x25 B   x   Prone knee bends X30, B  2# x   Supine SLR X30, B 2# x     Prone quad stretch (manual) 30 sec/ x3    Lat pull down 3 plates, 1Z43    Swiss ball (red) reverse curl x10        \"        \"      \"     Lateral roll x10 B        \"        \"      \"     bridge x10    Quad camel/cat stretch 5x6\" x                         Therapeutic Exercise/Home Exercise Program:   x50 min. Reviewed and progressed exercises as noted abovewith new handout given. Group Therapy:    0 minutes    Therapeutic Activity:  0 minutes     Gait: 0 minutes    Neuromuscular Re-Education:  0 minutes      Canalith Repositioning Procedure:  0 minutes    Manual Therapy:  0 minutes    Neutral alignment noted without MET needed today. Modalities: 0 minutes   [] GAME READY (VASO)- for significant edema, swelling, pain control.      Functional Outcome Measure:   []NA  Measure Used:  Oswestry  Date Assessed: 1/7/21  Score: 48%    Assessment/Treatment/Activity Tolerance:    Patients response to treatment:   [x]Patient tolerated treatment well with no adverse reactions noted    []Patient limited by fatigue   []Patient limited by pain []Patient limited by other medical complications   [x]Other:  Slight decrease in pain noted after treatment 4-5/10. Goals:   Progress towards goals:  Goals established on 1/7/21  GOALS:  Short Term Goals:2 weeks  1. Independent in HEP and progression per patient tolerance, in order to prevent re-injury. []? Progressing: []? Met: []? Not Met: []? Adjusted            2. Patient will have a decrease in pain to facilitate improvement in movement, function, and ADLs as indicated by Functional Deficits. []? Progressing: []? Met: []? Not Met: []? Adjusted               Long Term Goals:  8 weeks  1. Disability index score of 20% or less for the oswestry functional questionnaire to assist with reaching prior level of function. []? Progressing: []? Met: []? Not Met: []? Adjusted           2. Patient will demonstrate increased  Lumbar AROM to WNL to allow for proper joint functioning as indicated by patients Functional Deficits. []? Progressing: []? Met: []? Not Met: []? Adjusted            3. Patient will demonstrate an increase in strength to 4+/5 or > to allow for proper functional mobility as indicated by patients Functional Deficits. []? Progressing: []? Met: []? Not Met: []? Adjusted            4. Patient will return to all transfers, work activities, and functional activities without increased symptoms or restriction. []? Progressing: []? Met: []? Not Met: []? Adjusted            5. Patient will have 0-2/10 pain with ADL's.  []? Progressing: []? Met: []? Not Met: []? Adjusted            6. Patient stated goal: Patient will sleep through the night without waking due to pain  []? Progressing: []? Met: []? Not Met: []?  Adjusted              Prognosis: [x]Good   []Fair   []Poor    Patient Requires Follow-up:  [x]Yes  []No    Plan: []Plan of care initiated     [x]Continue per plan of care    [] Alter current plan (see comments)    []Hold pending MD visit []Discharge    Charges:

## 2021-01-27 ENCOUNTER — APPOINTMENT (OUTPATIENT)
Dept: FAMILY MEDICINE | Facility: CLINIC | Age: 66
End: 2021-01-27
Payer: COMMERCIAL

## 2021-01-27 VITALS
OXYGEN SATURATION: 97 % | SYSTOLIC BLOOD PRESSURE: 122 MMHG | DIASTOLIC BLOOD PRESSURE: 70 MMHG | WEIGHT: 260 LBS | BODY MASS INDEX: 37.31 KG/M2 | HEART RATE: 65 BPM

## 2021-01-27 DIAGNOSIS — R00.2 PALPITATIONS: ICD-10-CM

## 2021-01-27 DIAGNOSIS — Z87.09 PERSONAL HISTORY OF OTHER DISEASES OF THE RESPIRATORY SYSTEM: ICD-10-CM

## 2021-01-27 DIAGNOSIS — Z86.711 PERSONAL HISTORY OF PULMONARY EMBOLISM: ICD-10-CM

## 2021-01-27 DIAGNOSIS — R79.1 ABNORMAL COAGULATION PROFILE: ICD-10-CM

## 2021-01-27 DIAGNOSIS — Z87.898 PERSONAL HISTORY OF OTHER SPECIFIED CONDITIONS: ICD-10-CM

## 2021-01-27 DIAGNOSIS — Z01.818 ENCOUNTER FOR OTHER PREPROCEDURAL EXAMINATION: ICD-10-CM

## 2021-01-27 DIAGNOSIS — R53.81 OTHER MALAISE: ICD-10-CM

## 2021-01-27 DIAGNOSIS — Z86.718 PERSONAL HISTORY OF OTHER VENOUS THROMBOSIS AND EMBOLISM: ICD-10-CM

## 2021-01-27 DIAGNOSIS — Z87.448 PERSONAL HISTORY OF OTHER DISEASES OF URINARY SYSTEM: ICD-10-CM

## 2021-01-27 DIAGNOSIS — H93.12 TINNITUS, LEFT EAR: ICD-10-CM

## 2021-01-27 DIAGNOSIS — Z09 ENCOUNTER FOR FOLLOW-UP EXAMINATION AFTER COMPLETED TREATMENT FOR CONDITIONS OTHER THAN MALIGNANT NEOPLASM: ICD-10-CM

## 2021-01-27 DIAGNOSIS — Z87.2 PERSONAL HISTORY OF DISEASES OF THE SKIN AND SUBCUTANEOUS TISSUE: ICD-10-CM

## 2021-01-27 PROCEDURE — G0008: CPT

## 2021-01-27 PROCEDURE — 99214 OFFICE O/P EST MOD 30 MIN: CPT | Mod: 25

## 2021-01-27 PROCEDURE — 99072 ADDL SUPL MATRL&STAF TM PHE: CPT

## 2021-01-27 PROCEDURE — 90686 IIV4 VACC NO PRSV 0.5 ML IM: CPT

## 2021-01-27 RX ORDER — MELATONIN/PYRIDOXINE HCL (B6) 5 MG-10 MG
100 TABLET,IMMED, EXTENDED RELEASE, BIPHASIC ORAL
Refills: 0 | Status: ACTIVE | COMMUNITY

## 2021-01-27 RX ORDER — CALCIUM CARBONATE/VITAMIN D3 600 MG-10
TABLET ORAL
Refills: 0 | Status: ACTIVE | COMMUNITY

## 2021-01-27 RX ORDER — BENZOCAINE/BENZALKONIUM/ALOE/E 5 %-0.13 %
CREAM (GRAM) TOPICAL
Refills: 0 | Status: ACTIVE | COMMUNITY

## 2021-01-27 RX ORDER — ADHESIVE TAPE 3"X 2.3 YD
50 MCG TAPE, NON-MEDICATED TOPICAL
Refills: 0 | Status: ACTIVE | COMMUNITY

## 2021-01-27 RX ORDER — MULTIVIT-MIN/FOLIC/VIT K/LYCOP 400-300MCG
1000 TABLET ORAL
Refills: 0 | Status: ACTIVE | COMMUNITY

## 2021-01-27 RX ORDER — TURMERIC ROOT EXTRACT 500 MG
TABLET ORAL
Refills: 0 | Status: ACTIVE | COMMUNITY

## 2021-01-27 NOTE — HISTORY OF PRESENT ILLNESS
[FreeTextEntry1] : here for bw and med refills\par feeling well\par s/p 2 stents 5 mths ago, due to see cardio\par takes multiple vitamins\par had covid

## 2021-01-27 NOTE — PHYSICAL EXAM
[No Acute Distress] : no acute distress [Well Nourished] : well nourished [Normal Sclera/Conjunctiva] : normal sclera/conjunctiva [Normal] : normal rate, regular rhythm, normal S1 and S2 and no murmur heard [Normal Outer Ear/Nose] : the outer ears and nose were normal in appearance [No JVD] : no jugular venous distention [Coordination Grossly Intact] : coordination grossly intact [Normal Affect] : the affect was normal [Alert and Oriented x3] : oriented to person, place, and time [Normal Insight/Judgement] : insight and judgment were intact [de-identified] : extra beats auscultated

## 2021-01-28 ENCOUNTER — APPOINTMENT (OUTPATIENT)
Dept: PHYSICAL THERAPY | Age: 66
End: 2021-01-28
Payer: MEDICARE

## 2021-01-28 LAB
ALBUMIN SERPL ELPH-MCNC: 4.6 G/DL
ALP BLD-CCNC: 34 U/L
ALT SERPL-CCNC: 24 U/L
ANION GAP SERPL CALC-SCNC: 12 MMOL/L
AST SERPL-CCNC: 24 U/L
BASOPHILS # BLD AUTO: 0.04 K/UL
BASOPHILS NFR BLD AUTO: 0.7 %
BILIRUB SERPL-MCNC: 0.5 MG/DL
BUN SERPL-MCNC: 20 MG/DL
CALCIUM SERPL-MCNC: 9.7 MG/DL
CHLORIDE SERPL-SCNC: 107 MMOL/L
CHOLEST SERPL-MCNC: 122 MG/DL
CO2 SERPL-SCNC: 23 MMOL/L
CREAT SERPL-MCNC: 1.16 MG/DL
EOSINOPHIL # BLD AUTO: 0.14 K/UL
EOSINOPHIL NFR BLD AUTO: 2.4 %
GLUCOSE SERPL-MCNC: 103 MG/DL
HCT VFR BLD CALC: 43.9 %
HDLC SERPL-MCNC: 31 MG/DL
HGB BLD-MCNC: 14.7 G/DL
IMM GRANULOCYTES NFR BLD AUTO: 0.3 %
LDLC SERPL CALC-MCNC: 71 MG/DL
LYMPHOCYTES # BLD AUTO: 1.79 K/UL
LYMPHOCYTES NFR BLD AUTO: 30.2 %
MAN DIFF?: NORMAL
MCHC RBC-ENTMCNC: 33.5 GM/DL
MCHC RBC-ENTMCNC: 33.5 PG
MCV RBC AUTO: 100 FL
MONOCYTES # BLD AUTO: 0.66 K/UL
MONOCYTES NFR BLD AUTO: 11.1 %
NEUTROPHILS # BLD AUTO: 3.28 K/UL
NEUTROPHILS NFR BLD AUTO: 55.3 %
NONHDLC SERPL-MCNC: 91 MG/DL
PLATELET # BLD AUTO: 218 K/UL
POTASSIUM SERPL-SCNC: 4.7 MMOL/L
PROT SERPL-MCNC: 6.4 G/DL
RBC # BLD: 4.39 M/UL
RBC # FLD: 12.6 %
SODIUM SERPL-SCNC: 141 MMOL/L
TRIGL SERPL-MCNC: 102 MG/DL
URATE SERPL-MCNC: 5.9 MG/DL
WBC # FLD AUTO: 5.93 K/UL

## 2021-01-29 ENCOUNTER — HOSPITAL ENCOUNTER (OUTPATIENT)
Dept: PHYSICAL THERAPY | Age: 66
Setting detail: THERAPIES SERIES
Discharge: HOME | End: 2021-01-29
Payer: MEDICARE

## 2021-01-29 LAB
ESTIMATED AVERAGE GLUCOSE: 105 MG/DL
HBA1C MFR BLD HPLC: 5.3 %

## 2021-01-29 PROCEDURE — 97110 THERAPEUTIC EXERCISES: CPT

## 2021-01-29 NOTE — FLOWSHEET NOTE
Exercises:    Exercises in bold performed in department today. Items not bolded are carried forward from prior visits for continuity of the record. Exercise/Equipment Resistance/Repetitions Issued for HEP   pelvic tilt/TA  6 sec/ x10 x   LE trunk rotation x10 x   SKTC x5 x   Supine piriformis stretch 30 sec/ x3  (manual) x   Seated HS stretch 30 sec/ x3  (manual) x   Standing heel toe raises x15 B x   Standing high marches with opposite arm lift x20 B  2# on B LE's x   standing hip abd  x15 B x   Standing hip ext x15 B x   sidelying clam shells x25 B  blue tband x   sidelying hip abd x30 B, 2# x   Bridges (with tband around knees, up/out/together/down) x30 B  blue tband x     nustep S10 L6, x5 min    Multifidus handshake  2#, 3x10 x   TA with march x30 B  2# x   Prone alt LE lifts x30 B  2#   x   Prone knee bends X30, B  2# x   Supine SLR X30, B 2# x     Prone quad stretch (manual) 30 sec/ x3    Lat pull down 3 plates, 2J20    Swiss ball (red) reverse curl x30        \"        \"      \"     Lateral roll x30 B        \"        \"      \"     bridge x10    Quad camel/cat stretch 5x6\" x                         Therapeutic Exercise/Home Exercise Program:   x45 min. Reviewed and progressed exercises as noted above. Group Therapy:    0 minutes    Therapeutic Activity:  0 minutes     Gait: 0 minutes    Neuromuscular Re-Education:  0 minutes      Canalith Repositioning Procedure:  0 minutes    Manual Therapy:  0 minutes    Neutral alignment noted without MET needed today. Modalities: 0 minutes   [] GAME READY (VASO)- for significant edema, swelling, pain control.      Functional Outcome Measure:   []NA  Measure Used:  Oswestry  Date Assessed: 1/7/21  Score: 48%    Assessment/Treatment/Activity Tolerance:    Patients response to treatment:   [x]Patient tolerated treatment well with no adverse reactions noted    []Patient limited by fatigue   []Patient limited by pain []Patient limited by other medical complications   [x]Other:  Pain remained 3/10 after treatment    Goals:   Progress towards goals:  Goals established on 1/7/21  GOALS:  Short Term Goals:2 weeks  1. Independent in HEP and progression per patient tolerance, in order to prevent re-injury. []? Progressing: []? Met: []? Not Met: []? Adjusted            2. Patient will have a decrease in pain to facilitate improvement in movement, function, and ADLs as indicated by Functional Deficits. []? Progressing: []? Met: []? Not Met: []? Adjusted               Long Term Goals:  8 weeks  1. Disability index score of 20% or less for the oswestry functional questionnaire to assist with reaching prior level of function. []? Progressing: []? Met: []? Not Met: []? Adjusted           2. Patient will demonstrate increased  Lumbar AROM to WNL to allow for proper joint functioning as indicated by patients Functional Deficits. []? Progressing: []? Met: []? Not Met: []? Adjusted            3. Patient will demonstrate an increase in strength to 4+/5 or > to allow for proper functional mobility as indicated by patients Functional Deficits. []? Progressing: []? Met: []? Not Met: []? Adjusted            4. Patient will return to all transfers, work activities, and functional activities without increased symptoms or restriction. []? Progressing: []? Met: []? Not Met: []? Adjusted            5. Patient will have 0-2/10 pain with ADL's.  []? Progressing: []? Met: []? Not Met: []? Adjusted            6. Patient stated goal: Patient will sleep through the night without waking due to pain  []? Progressing: []? Met: []? Not Met: []?  Adjusted              Prognosis: [x]Good   []Fair   []Poor    Patient Requires Follow-up:  [x]Yes  []No    Plan: []Plan of care initiated     [x]Continue per plan of care    [] Alter current plan (see comments)    []Hold pending MD visit []Discharge    Charges:  Timed Code Treatment Minutes: 39 Total Treatment Minutes:  45   BWC time for each procedure?:  TE TIME:  NMR TIME:  MANUAL TIME:  UNTIMED MINUTES:       [] EVAL (LOW) 91605 (typically 20 minutes face-to-face)  [] EVAL (MOD) 19395 (typically 30 minutes face-to-face)  [] EVAL (HIGH) 38891 (typically 45 minutes face-to-face)  [] RE-EVAL     [x] MZ(37682) x 3    [] IONTO  [] NMR (47659) x     [] VASO  [] Manual (59017) x     [] Other:  [] TA x      [] Mech Traction (19537)  [] ES(attended) (24650)     [] ES (un) (36056):        Electronically signed by:  Sara Mays, PT, MPT [id number]    Note: If patient does not return for scheduled/ recommended follow up visits, this note will serve as a discharge from care along with most recent update on progress.

## 2021-02-05 ENCOUNTER — HOSPITAL ENCOUNTER (OUTPATIENT)
Dept: PHYSICAL THERAPY | Age: 66
Setting detail: THERAPIES SERIES
Discharge: HOME | End: 2021-02-05
Payer: MEDICARE

## 2021-03-01 ENCOUNTER — OFFICE VISIT (OUTPATIENT)
Dept: ORTHOPEDIC SURGERY | Age: 66
End: 2021-03-01
Payer: MEDICARE

## 2021-03-01 VITALS — WEIGHT: 140 LBS | BODY MASS INDEX: 21.22 KG/M2 | HEIGHT: 68 IN

## 2021-03-01 DIAGNOSIS — M51.36 DDD (DEGENERATIVE DISC DISEASE), LUMBAR: ICD-10-CM

## 2021-03-01 DIAGNOSIS — M54.16 LUMBAR RADICULITIS: ICD-10-CM

## 2021-03-01 DIAGNOSIS — M43.16 SPONDYLOLISTHESIS, LUMBAR REGION: ICD-10-CM

## 2021-03-01 DIAGNOSIS — M43.06 LUMBAR SPONDYLOLYSIS: Primary | ICD-10-CM

## 2021-03-01 PROBLEM — M54.50 CHRONIC LOW BACK PAIN: Status: ACTIVE | Noted: 2017-06-09

## 2021-03-01 PROBLEM — I10 ESSENTIAL HYPERTENSION: Status: ACTIVE | Noted: 2018-04-26

## 2021-03-01 PROBLEM — R03.0 ELEVATED BLOOD PRESSURE READING: Status: ACTIVE | Noted: 2017-06-09

## 2021-03-01 PROBLEM — G89.29 CHRONIC LOW BACK PAIN: Status: ACTIVE | Noted: 2017-06-09

## 2021-03-01 PROBLEM — Z72.0 TOBACCO USE: Status: ACTIVE | Noted: 2017-06-09

## 2021-03-01 PROCEDURE — G8427 DOCREV CUR MEDS BY ELIG CLIN: HCPCS | Performed by: PHYSICIAN ASSISTANT

## 2021-03-01 PROCEDURE — G8484 FLU IMMUNIZE NO ADMIN: HCPCS | Performed by: PHYSICIAN ASSISTANT

## 2021-03-01 PROCEDURE — 4004F PT TOBACCO SCREEN RCVD TLK: CPT | Performed by: PHYSICIAN ASSISTANT

## 2021-03-01 PROCEDURE — 3017F COLORECTAL CA SCREEN DOC REV: CPT | Performed by: PHYSICIAN ASSISTANT

## 2021-03-01 PROCEDURE — G8420 CALC BMI NORM PARAMETERS: HCPCS | Performed by: PHYSICIAN ASSISTANT

## 2021-03-01 PROCEDURE — 99214 OFFICE O/P EST MOD 30 MIN: CPT | Performed by: PHYSICIAN ASSISTANT

## 2021-03-01 RX ORDER — OMEPRAZOLE 40 MG/1
CAPSULE, DELAYED RELEASE ORAL
COMMUNITY
Start: 2021-02-17 | End: 2021-03-29

## 2021-03-01 NOTE — PROGRESS NOTES
Follow up: [facility]    CHIEF COMPLAINT:    Chief Complaint   Patient presents with    Back Pain     F/U LBP       HISTORY OF PRESENT ILLNESS:                The patient is a 60 y.o. male here to follow-up after physical therapy and pharmacologic care for chronic low back and bilateral leg pain. He reports chronic aching low back pain radiating into the left or right posterior thighs, rarely to the calf. Symptoms have been increasing over the last 3 to 4 years. Periodically he will have some numbness and tingling affecting his lower extremities. Symptoms are increased with walking and standing and improved with sitting and resting. Conservative care includes physical therapy, MDP, NSAIDs, HEP. He denies any improvement at this time and feels his symptoms are worsening. He denies any progressive extremity weakness. No recent bowel or bladder dysfunction or saddle anesthesia. No recent injury or trauma. No recent fevers chills or infections. Overall he is not improved. Current/Past Treatment:   · Physical Therapy: Yes since 1/7/2021 to present  · Chiropractic:     · Injection:     Medications:            NSAIDS: Aleve            Muscle relaxer:              Steriods:   MDP            Neuropathic medications:              Opioids:            Other:   · Surgery/Consult: No    Work Status/Functionality: Remodeling homes    Past Medical History: Medical history form was reviewed today & scanned into the media tab  History reviewed. No pertinent past medical history. Past Surgical History:     History reviewed. No pertinent surgical history.   Current Medications:     Current Outpatient Medications:     omeprazole (PRILOSEC) 40 MG delayed release capsule, TAKE 1 CAPSULE BY MOUTH EVERY DAY BEFORE A MEAL, Disp: , Rfl:     amLODIPine (NORVASC) 5 MG tablet, TAKE 1 TABLET BY MOUTH EVERY DAY, Disp: , Rfl:     aspirin 81 MG chewable tablet, Take by mouth, Disp: , Rfl:   Allergies:  Patient has no known allergies. Social History:    reports that he has been smoking. He has never used smokeless tobacco.  Family History:   History reviewed. No pertinent family history. REVIEW OF SYSTEMS: Full ROS noted & scanned   CONSTITUTIONAL: Denies unexplained weight loss, fevers, chills or fatigue  NEUROLOGICAL: Denies unsteady gait or progressive weakness    PHYSICAL EXAM:    Vitals: Height 5' 8.4\" (1.737 m), weight 140 lb (63.5 kg). Pain scale 7/10    GENERAL EXAM:  · General Apparence: Patient is adequately groomed with no evidence of malnutrition. · Orientation: The patient is oriented to time, place and person. · Mood & Affect:The patient's mood and affect are appropriate   · Lymphatic: The lymphatic examination bilaterally reveals all areas to be without enlargement or induration  · Sensation: Sensation is intact without deficit  · Coordination/Balance: Good coordination   LUMBAR/SACRAL EXAMINATION:  · Inspection: Local inspection shows no step-off or bruising. Lumbar alignment is normal.  Sagittal and Coronal balance is neutral.      · Palpation:   No evidence of tenderness at the midline. No tenderness bilaterally at the paraspinal or trochanters. There is no step-off or paraspinal spasm. · Range of Motion: 40 degrees of flexion, 10 degrees of extension more pain with extension  · Strength:   Strength testing is 5/5 in all muscle groups tested. · Special Tests:   Straight leg raise and crossed SLR negative. Leg length and pelvis level.  0 out of 5 Lena's signs. · Skin: There are no rashes, ulcerations or lesions. · Reflexes: Reflexes are symmetrically 1+ with reinforcement at the patellar and trace ankle tendons. Clonus absent bilaterally at the feet. · Gait & station: Normal unassisted  · Additional Examinations:   · RIGHT LOWER EXTREMITY: Inspection/examination of the right lower extremity does not show any tenderness, deformity or injury. Range of motion is full.  There is no gross instability. There are no rashes, ulcerations or lesions. Strength and tone are normal.  ·   · LEFT LOWER EXTREMITY:  Inspection/examination of the left lower extremity does not show any tenderness, deformity or injury. Range of motion is full. There is no gross instability. There are no rashes, ulcerations or lesions.   Strength and tone are normal.    Diagnostic Testing:    Lumbar x-rays films and report independently reviewed June 2020 showing L4-5 spondylolisthesis, severe L5-S1 DDD    2 views lumbar spine flexion extension 11/12/2020 no high-grade instability L4-5 spondylolisthesis        Impression:  1) Chronic LBP, bilateral lumbar radiculitis, neurogenic claudication--clinical course not improved     2) L4-5 spondy, severe L5-S1 DDD      Plan:   1) He has now failed recent PT and pharmacologic care recommend lumbar MRI scan  WO for further evaluation    2) We discussed LESI if warranted once MRI is reviewed    3) Cont HEP; OTC NSAIDs PRN    4) F/u after MRI to review          [facility]

## 2021-03-02 ENCOUNTER — TELEPHONE (OUTPATIENT)
Dept: ORTHOPEDIC SURGERY | Age: 66
End: 2021-03-02

## 2021-03-02 NOTE — TELEPHONE ENCOUNTER
FAXED [facility] ) --ALL--  [facility] [address] @ [phone]

## 2021-03-09 ENCOUNTER — TELEPHONE (OUTPATIENT)
Dept: ORTHOPEDIC SURGERY | Age: 66
End: 2021-03-09

## 2021-03-09 NOTE — TELEPHONE ENCOUNTER
Other Patient would like for the office to contact insurance for mri approval and would like a call back.  ph [phone]

## 2021-03-14 ENCOUNTER — HOSPITAL ENCOUNTER (OUTPATIENT)
Dept: GENERAL RADIOLOGY | Age: 66
Discharge: HOME | End: 2021-03-14
Payer: MEDICARE

## 2021-03-14 ENCOUNTER — HOSPITAL ENCOUNTER (OUTPATIENT)
Age: 66
Discharge: HOME | End: 2021-03-14
Payer: MEDICARE

## 2021-03-14 DIAGNOSIS — M47.816 LUMBAR SPONDYLOSIS: ICD-10-CM

## 2021-03-14 DIAGNOSIS — M46.1 BILATERAL SACROILIITIS (HCC): ICD-10-CM

## 2021-03-14 PROCEDURE — 72170 X-RAY EXAM OF PELVIS: CPT

## 2021-03-14 PROCEDURE — 72202 X-RAY EXAM SI JOINTS 3/> VWS: CPT

## 2021-03-14 PROCEDURE — 72100 X-RAY EXAM L-S SPINE 2/3 VWS: CPT

## 2021-03-15 ENCOUNTER — TELEPHONE (OUTPATIENT)
Dept: ORTHOPEDIC SURGERY | Age: 66
End: 2021-03-15

## 2021-03-16 ENCOUNTER — HOSPITAL ENCOUNTER (OUTPATIENT)
Dept: MRI IMAGING | Age: 66
Discharge: HOME | End: 2021-03-16
Payer: MEDICARE

## 2021-03-16 DIAGNOSIS — M43.06 LUMBAR SPONDYLOLYSIS: ICD-10-CM

## 2021-03-16 DIAGNOSIS — M51.36 DDD (DEGENERATIVE DISC DISEASE), LUMBAR: ICD-10-CM

## 2021-03-16 DIAGNOSIS — M54.16 LUMBAR RADICULITIS: ICD-10-CM

## 2021-03-16 DIAGNOSIS — M43.16 SPONDYLOLISTHESIS, LUMBAR REGION: ICD-10-CM

## 2021-03-16 PROCEDURE — 72148 MRI LUMBAR SPINE W/O DYE: CPT

## 2021-03-29 ENCOUNTER — OFFICE VISIT (OUTPATIENT)
Dept: ORTHOPEDIC SURGERY | Age: 66
End: 2021-03-29
Payer: MEDICARE

## 2021-03-29 VITALS — HEIGHT: 68 IN | WEIGHT: 140 LBS | BODY MASS INDEX: 21.22 KG/M2

## 2021-03-29 DIAGNOSIS — M43.06 LUMBAR SPONDYLOLYSIS: Primary | ICD-10-CM

## 2021-03-29 DIAGNOSIS — M54.16 LUMBAR RADICULITIS: ICD-10-CM

## 2021-03-29 DIAGNOSIS — M51.36 DDD (DEGENERATIVE DISC DISEASE), LUMBAR: ICD-10-CM

## 2021-03-29 DIAGNOSIS — M43.16 SPONDYLOLISTHESIS, LUMBAR REGION: ICD-10-CM

## 2021-03-29 PROCEDURE — G8484 FLU IMMUNIZE NO ADMIN: HCPCS | Performed by: PHYSICIAN ASSISTANT

## 2021-03-29 PROCEDURE — G8427 DOCREV CUR MEDS BY ELIG CLIN: HCPCS | Performed by: PHYSICIAN ASSISTANT

## 2021-03-29 PROCEDURE — 4004F PT TOBACCO SCREEN RCVD TLK: CPT | Performed by: PHYSICIAN ASSISTANT

## 2021-03-29 PROCEDURE — 3017F COLORECTAL CA SCREEN DOC REV: CPT | Performed by: PHYSICIAN ASSISTANT

## 2021-03-29 PROCEDURE — G8420 CALC BMI NORM PARAMETERS: HCPCS | Performed by: PHYSICIAN ASSISTANT

## 2021-03-29 PROCEDURE — 99214 OFFICE O/P EST MOD 30 MIN: CPT | Performed by: PHYSICIAN ASSISTANT

## 2021-03-29 NOTE — PROGRESS NOTES
Follow up: SPINE    CHIEF COMPLAINT:    Chief Complaint   Patient presents with    Back Pain     F/U LUMBAR MRI       HISTORY OF PRESENT ILLNESS:                The patient is a 60 y.o. male here to follow-up to review lumbar MRI for chronic low back and bilateral leg pain. He reports chronic aching low back pain radiating to the bilateral posterior thighs, rarely to the calf. Symptoms have been increasing over the last 3 to 4 years. Periodically he will have some numbness and tingling affecting his lower extremities. His pain is increased with walking and standing; improved with sitting and resting. Conservative care includes physical therapy, MDP, NSAIDs, HEP. He is now currently seeing a pain management provider whom has recommended [place]. He denies any improvement at this time and feels his symptoms are worsening. He denies any progressive extremity weakness. No recent bowel or bladder dysfunction or saddle anesthesia. No recent injury or trauma. No recent fevers chills. Current/Past Treatment:   · Physical Therapy: Yes since 1/7/2021 to present  · Chiropractic:     · Injection: Currently seeing pain management whom has recommended [place]  Medications:            NSAIDS: Aleve            Muscle relaxer:              Steriods:   MDP            Neuropathic medications:              Opioids:             Other:   · Surgery/Consult: No    Work Status/Functionality: Remodeling homes    Past Medical History: Medical history form was reviewed today & scanned into the media tab  No past medical history on file. Past Surgical History:     No past surgical history on file. Current Medications:     Current Outpatient Medications:     amLODIPine (NORVASC) 5 MG tablet, TAKE 1 TABLET BY MOUTH EVERY DAY, Disp: , Rfl:     aspirin 81 MG chewable tablet, Take by mouth, Disp: , Rfl:   Allergies:  Patient has no known allergies. Social History:    reports that he has been smoking.  He has never used smokeless tobacco.  Family History:   No family history on file. REVIEW OF SYSTEMS: Full ROS noted & scanned   CONSTITUTIONAL: Denies unexplained weight loss, fevers, chills or fatigue  NEUROLOGICAL: Denies unsteady gait or progressive weakness    PHYSICAL EXAM:    Vitals: Height 5' 8.4\" (1.737 m), weight 140 lb (63.5 kg). Pain scale 6/10    GENERAL EXAM:  · General Apparence: Patient is adequately groomed with no evidence of malnutrition. · Orientation: The patient is oriented to time, place and person. · Mood & Affect:The patient's mood and affect are appropriate   · Lymphatic: The lymphatic examination bilaterally reveals all areas to be without enlargement or induration  · Sensation: Sensation is intact without deficit  · Coordination/Balance: Good coordination   ·   LUMBAR/SACRAL EXAMINATION:  · Inspection: Local inspection shows no step-off or bruising. · Palpation:   No evidence of tenderness at the midline. No tenderness bilaterally at the paraspinal or trochanters. There is no step-off or paraspinal spasm. · Range of Motion: 45 degrees of flexion, 10 degrees of extension more pain with extension  · Strength:   Strength testing is 5/5 in all muscle groups tested. · Special Tests:   Straight leg raise and crossed SLR negative. Leg length and pelvis level.  0 out of 5 Lena's signs. · Skin: There are no rashes, ulcerations or lesions. · Reflexes: Reflexes are symmetrically 1+ with reinforcement at the patellar and trace ankle tendons. Clonus absent bilaterally at the feet. · Gait & station: Normal unassisted  · Additional Examinations:   · RIGHT LOWER EXTREMITY: Inspection/examination of the right lower extremity does not show any tenderness, deformity or injury. Range of motion is full. There is no gross instability. There are no rashes, ulcerations or lesions.  Strength and tone are normal.  ·   · LEFT LOWER EXTREMITY:  Inspection/examination of the left lower extremity does not show any tenderness, deformity or injury. Range of motion is full. There is no gross instability. There are no rashes, ulcerations or lesions. Strength and tone are normal.    Diagnostic Testing:    Lumbar MRI scan report independently reviewed from March 2021 showing L4-5 spondylolisthesis with severe central stenosis, right foraminal protrusion L5-S1, multilevel DDD/spondylosis, bone marrow edema L5-S1 vertebral bodies which may represent Modic degenerative endplate changes    Lumbar x-rays films and report independently reviewed June 2020 showing L4-5 spondylolisthesis, severe L5-S1 DDD    2 views lumbar spine flexion extension 11/12/2020 no high-grade instability L4-5 spondylolisthesis        Impression:  1) Chronic LBP, bilateral lumbar radiculitis, neurogenic claudication--clinical course not improved     2) L4-5 spondy with severe central stenosis, right foraminal HNP L5-S1        Plan:   1)  We reviewed his lumbar MRI scan. We discussed [place] versus surgical consultation. He states his pain management provider is planning an [place] this week. I have provided him with a referral to Dr. Batsheva Chakraborty for surgical consultation as well.     2) Sidney Blackman [facility]

## 2021-05-06 ENCOUNTER — OFFICE VISIT (OUTPATIENT)
Dept: ORTHOPEDIC SURGERY | Age: 66
End: 2021-05-06
Payer: MEDICARE

## 2021-05-06 VITALS — WEIGHT: 140 LBS | HEIGHT: 68 IN | BODY MASS INDEX: 21.22 KG/M2

## 2021-05-06 DIAGNOSIS — M43.10 DEGENERATIVE SPONDYLOLISTHESIS: Primary | ICD-10-CM

## 2021-05-06 PROCEDURE — G8420 CALC BMI NORM PARAMETERS: HCPCS | Performed by: ORTHOPAEDIC SURGERY

## 2021-05-06 PROCEDURE — 3017F COLORECTAL CA SCREEN DOC REV: CPT | Performed by: ORTHOPAEDIC SURGERY

## 2021-05-06 PROCEDURE — 4004F PT TOBACCO SCREEN RCVD TLK: CPT | Performed by: ORTHOPAEDIC SURGERY

## 2021-05-06 PROCEDURE — G8427 DOCREV CUR MEDS BY ELIG CLIN: HCPCS | Performed by: ORTHOPAEDIC SURGERY

## 2021-05-06 PROCEDURE — 99214 OFFICE O/P EST MOD 30 MIN: CPT | Performed by: ORTHOPAEDIC SURGERY

## 2021-05-06 NOTE — PROGRESS NOTES
Follow up: SPINE    CHIEF COMPLAINT:    Chief Complaint   Patient presents with    Back Pain     Patient is referred by Yaneth Hernandez. Patient states that his back pain began about 10 years ago but has gotten progressively worse. Patient complains of low back pain and intermittant bilateral leg pain. He tried PT and got some relief. He just had ALEXANDRIA and his pain has improved. HISTORY OF PRESENT ILLNESS:                The patient is a 60 y.o. male here to follow-up to review lumbar MRI for chronic low back and bilateral leg pain. He reports chronic aching low back pain radiating to the bilateral posterior thighs, rarely to the calf. Symptoms have been increasing over the last 3 to 4 years. Periodically he will have some numbness and tingling affecting his lower extremities. His pain is increased with walking and standing; improved with sitting and resting. Conservative care includes physical therapy, MDP, NSAIDs, ALEXANDRIA, and HEP. James Doll He denies any progressive extremity weakness. No recent bowel or bladder dysfunction or saddle anesthesia. Current/Past Treatment:   · Physical Therapy: Yes since 1/7/2021 to present  · Chiropractic: None  · Injection: SI x1 which was very helpful  Medications:            NSAIDS: Aleve            Muscle relaxer:              Steriods:   MDP            Neuropathic medications:              Opioids:             Other:   · Surgery/Consult: No    Work Status/Functionality: Remodeling homes    Past Medical History: Medical history form was reviewed today & scanned into the media tab  History reviewed. No pertinent past medical history. Past Surgical History:     History reviewed. No pertinent surgical history. Current Medications:     Current Outpatient Medications:     amLODIPine (NORVASC) 5 MG tablet, TAKE 1 TABLET BY MOUTH EVERY DAY, Disp: , Rfl:     aspirin 81 MG chewable tablet, Take by mouth, Disp: , Rfl:   Allergies:  Patient has no known allergies.   Social normal.  ·   · LEFT LOWER EXTREMITY:  Inspection/examination of the left lower extremity does not show any tenderness, deformity or injury. Range of motion is full. There is no gross instability. There are no rashes, ulcerations or lesions. Strength and tone are normal.    Diagnostic Testing:    Lumbar MRI scan report independently reviewed from March 2021 showing L4-5 spondylolisthesis with severe central stenosis, right foraminal protrusion L5-S1, multilevel DDD/spondylosis, bone marrow edema L5-S1 vertebral bodies which may represent Modic degenerative endplate changes    Lumbar x-rays films and report independently reviewed June 2020 showing L4-5 spondylolisthesis, severe L5-S1 DDD    2 views lumbar spine flexion extension 11/12/2020 no high-grade instability L4-5 spondylolisthesis        Impression:  Denerative spondylolisthesis L4-L5 with central stenosis and neurogenic claudication    Right paracentral disc herniation L5-S1      Plan  Discussed treatment options including observation, additional epidural injections and microlumbar decompression. We discussed the risks, benefits, and alternatives to surgery including the risks of nerve or vessel injury, paralysis, spinal blood clot, spinal fluid leak, death, infection, need for additional spinal surgery adjacent level arthritis failure of surgery to alleviate his symptoms and worse symptoms following surgery. He understands these risks. His questions were answered.

## 2021-07-22 ENCOUNTER — RX RENEWAL (OUTPATIENT)
Age: 66
End: 2021-07-22

## 2021-08-16 ENCOUNTER — RX RENEWAL (OUTPATIENT)
Age: 66
End: 2021-08-16

## 2021-09-11 ENCOUNTER — RX RENEWAL (OUTPATIENT)
Age: 66
End: 2021-09-11

## 2021-09-30 ENCOUNTER — APPOINTMENT (OUTPATIENT)
Dept: FAMILY MEDICINE | Facility: CLINIC | Age: 66
End: 2021-09-30
Payer: COMMERCIAL

## 2021-09-30 VITALS
WEIGHT: 251 LBS | HEIGHT: 70 IN | BODY MASS INDEX: 35.93 KG/M2 | DIASTOLIC BLOOD PRESSURE: 76 MMHG | SYSTOLIC BLOOD PRESSURE: 128 MMHG | RESPIRATION RATE: 16 BRPM | OXYGEN SATURATION: 97 % | HEART RATE: 73 BPM | TEMPERATURE: 97.7 F

## 2021-09-30 PROCEDURE — 99214 OFFICE O/P EST MOD 30 MIN: CPT | Mod: 25

## 2021-10-02 LAB
ALBUMIN SERPL ELPH-MCNC: 4.7 G/DL
ALP BLD-CCNC: 45 U/L
ALT SERPL-CCNC: 26 U/L
ANION GAP SERPL CALC-SCNC: 13 MMOL/L
AST SERPL-CCNC: 25 U/L
BASOPHILS # BLD AUTO: 0.03 K/UL
BASOPHILS NFR BLD AUTO: 0.6 %
BILIRUB SERPL-MCNC: 0.6 MG/DL
BUN SERPL-MCNC: 17 MG/DL
CALCIUM SERPL-MCNC: 9.4 MG/DL
CHLORIDE SERPL-SCNC: 103 MMOL/L
CHOLEST SERPL-MCNC: 97 MG/DL
CO2 SERPL-SCNC: 26 MMOL/L
CREAT SERPL-MCNC: 0.89 MG/DL
EOSINOPHIL # BLD AUTO: 0.14 K/UL
EOSINOPHIL NFR BLD AUTO: 2.7 %
GLUCOSE SERPL-MCNC: 99 MG/DL
HCT VFR BLD CALC: 41.7 %
HDLC SERPL-MCNC: 26 MG/DL
HGB BLD-MCNC: 14 G/DL
IMM GRANULOCYTES NFR BLD AUTO: 0.4 %
LDLC SERPL CALC-MCNC: 51 MG/DL
LYMPHOCYTES # BLD AUTO: 1.64 K/UL
LYMPHOCYTES NFR BLD AUTO: 32 %
MAN DIFF?: NORMAL
MCHC RBC-ENTMCNC: 33.6 GM/DL
MCHC RBC-ENTMCNC: 34.1 PG
MCV RBC AUTO: 101.5 FL
MONOCYTES # BLD AUTO: 0.44 K/UL
MONOCYTES NFR BLD AUTO: 8.6 %
NEUTROPHILS # BLD AUTO: 2.86 K/UL
NEUTROPHILS NFR BLD AUTO: 55.7 %
NONHDLC SERPL-MCNC: 71 MG/DL
PLATELET # BLD AUTO: 198 K/UL
POTASSIUM SERPL-SCNC: 4.3 MMOL/L
PROT SERPL-MCNC: 6.7 G/DL
RBC # BLD: 4.11 M/UL
RBC # FLD: 13.2 %
SODIUM SERPL-SCNC: 141 MMOL/L
TRIGL SERPL-MCNC: 96 MG/DL
TSH SERPL-ACNC: 1.32 UIU/ML
WBC # FLD AUTO: 5.13 K/UL

## 2021-12-07 ENCOUNTER — APPOINTMENT (OUTPATIENT)
Dept: GENERAL RADIOLOGY | Age: 66
End: 2021-12-07
Payer: MEDICARE

## 2021-12-07 ENCOUNTER — HOSPITAL ENCOUNTER (EMERGENCY)
Age: 66
Discharge: HOME | End: 2021-12-07
Attending: EMERGENCY MEDICINE
Payer: MEDICARE

## 2021-12-07 VITALS
DIASTOLIC BLOOD PRESSURE: 86 MMHG | OXYGEN SATURATION: 100 % | WEIGHT: 135 LBS | SYSTOLIC BLOOD PRESSURE: 129 MMHG | HEART RATE: 82 BPM | BODY MASS INDEX: 20.53 KG/M2 | TEMPERATURE: 98 F | RESPIRATION RATE: 20 BRPM

## 2021-12-07 DIAGNOSIS — U07.1 COVID-19 VIRUS INFECTION: Primary | ICD-10-CM

## 2021-12-07 DIAGNOSIS — J98.01 BRONCHOSPASM: ICD-10-CM

## 2021-12-07 LAB
A/G RATIO: 1.4 (ref 1.1–2.2)
ALBUMIN SERPL-MCNC: 4.2 G/DL (ref 3.4–5)
ALP BLD-CCNC: 79 U/L (ref 40–129)
ALT SERPL-CCNC: 23 U/L (ref 10–40)
ANION GAP SERPL CALCULATED.3IONS-SCNC: 10 MMOL/L (ref 3–16)
AST SERPL-CCNC: 32 U/L (ref 15–37)
BANDED NEUTROPHILS RELATIVE PERCENT: 7 % (ref 0–7)
BASOPHILS ABSOLUTE: 0.1 K/UL (ref 0–0.2)
BASOPHILS RELATIVE PERCENT: 1 %
BILIRUB SERPL-MCNC: 0.3 MG/DL (ref 0–1)
BUN BLDV-MCNC: 14 MG/DL (ref 7–20)
CALCIUM SERPL-MCNC: 9.3 MG/DL (ref 8.3–10.6)
CHLORIDE BLD-SCNC: 99 MMOL/L (ref 99–110)
CO2: 24 MMOL/L (ref 21–32)
CREAT SERPL-MCNC: 0.7 MG/DL (ref 0.8–1.3)
EOSINOPHILS ABSOLUTE: 0.2 K/UL (ref 0–0.6)
EOSINOPHILS RELATIVE PERCENT: 3 %
GFR AFRICAN AMERICAN: >60
GFR NON-AFRICAN AMERICAN: >60
GLUCOSE BLD-MCNC: 110 MG/DL (ref 70–99)
HCT VFR BLD CALC: 43.8 % (ref 40.5–52.5)
HEMATOLOGY PATH CONSULT: NO
HEMOGLOBIN: 14.5 G/DL (ref 13.5–17.5)
INFLUENZA A: NOT DETECTED
INFLUENZA B: NOT DETECTED
LYMPHOCYTES ABSOLUTE: 1.1 K/UL (ref 1–5.1)
LYMPHOCYTES RELATIVE PERCENT: 20 %
MCH RBC QN AUTO: 33.3 PG (ref 26–34)
MCHC RBC AUTO-ENTMCNC: 33 G/DL (ref 31–36)
MCV RBC AUTO: 100.7 FL (ref 80–100)
MONOCYTES ABSOLUTE: 0.7 K/UL (ref 0–1.3)
MONOCYTES RELATIVE PERCENT: 14 %
NEUTROPHILS ABSOLUTE: 3.3 K/UL (ref 1.7–7.7)
NEUTROPHILS RELATIVE PERCENT: 55 %
PDW BLD-RTO: 12.8 % (ref 12.4–15.4)
PLATELET # BLD: 173 K/UL (ref 135–450)
PLATELET SLIDE REVIEW: ABNORMAL
PMV BLD AUTO: 9.3 FL (ref 5–10.5)
POTASSIUM REFLEX MAGNESIUM: 4.4 MMOL/L (ref 3.5–5.1)
RBC # BLD: 4.35 M/UL (ref 4.2–5.9)
SARS-COV-2 RNA, RT PCR: DETECTED
SLIDE REVIEW: ABNORMAL
SODIUM BLD-SCNC: 133 MMOL/L (ref 136–145)
TOTAL PROTEIN: 7.3 G/DL (ref 6.4–8.2)
TROPONIN: <0.01 NG/ML
WBC # BLD: 5.3 K/UL (ref 4–11)

## 2021-12-07 PROCEDURE — 93005 ELECTROCARDIOGRAM TRACING: CPT | Performed by: EMERGENCY MEDICINE

## 2021-12-07 PROCEDURE — 71045 X-RAY EXAM CHEST 1 VIEW: CPT

## 2021-12-07 PROCEDURE — 85025 COMPLETE CBC W/AUTO DIFF WBC: CPT

## 2021-12-07 PROCEDURE — 84484 ASSAY OF TROPONIN QUANT: CPT

## 2021-12-07 PROCEDURE — 87636 SARSCOV2 & INF A&B AMP PRB: CPT

## 2021-12-07 PROCEDURE — 6370000000 HC RX 637 (ALT 250 FOR IP): Performed by: EMERGENCY MEDICINE

## 2021-12-07 PROCEDURE — 99283 EMERGENCY DEPT VISIT LOW MDM: CPT

## 2021-12-07 PROCEDURE — 80053 COMPREHEN METABOLIC PANEL: CPT

## 2021-12-07 RX ORDER — ALBUTEROL SULFATE 90 UG/1
2 AEROSOL, METERED RESPIRATORY (INHALATION)
Qty: 54 G | Refills: 1 | Status: SHIPPED | OUTPATIENT
Start: 2021-12-07

## 2021-12-07 RX ORDER — IPRATROPIUM BROMIDE AND ALBUTEROL SULFATE 2.5; .5 MG/3ML; MG/3ML
1 SOLUTION RESPIRATORY (INHALATION) ONCE
Status: COMPLETED | OUTPATIENT
Start: 2021-12-07 | End: 2021-12-07

## 2021-12-07 RX ADMIN — IPRATROPIUM BROMIDE AND ALBUTEROL SULFATE 1 AMPULE: .5; 2.5 SOLUTION RESPIRATORY (INHALATION) at 19:09

## 2021-12-07 ASSESSMENT — PAIN SCALES - GENERAL: PAINLEVEL_OUTOF10: 2

## 2021-12-07 ASSESSMENT — PAIN DESCRIPTION - LOCATION: LOCATION: CHEST

## 2021-12-07 ASSESSMENT — PAIN DESCRIPTION - PAIN TYPE: TYPE: ACUTE PAIN

## 2021-12-08 ENCOUNTER — CARE COORDINATION (OUTPATIENT)
Dept: CARE COORDINATION | Age: 66
End: 2021-12-08

## 2021-12-08 LAB
EKG ATRIAL RATE: 83 BPM
EKG DIAGNOSIS: NORMAL
EKG P AXIS: 75 DEGREES
EKG P-R INTERVAL: 120 MS
EKG Q-T INTERVAL: 392 MS
EKG QRS DURATION: 90 MS
EKG QTC CALCULATION (BAZETT): 460 MS
EKG R AXIS: 50 DEGREES
EKG T AXIS: 64 DEGREES
EKG VENTRICULAR RATE: 83 BPM

## 2021-12-08 PROCEDURE — 93010 ELECTROCARDIOGRAM REPORT: CPT | Performed by: INTERNAL MEDICINE

## 2021-12-08 NOTE — ED NOTES
Pt resting in bed talking and laughing with staff. Pt on monitor at this time. NSR noted. Breathing tx completed, Pt tolerating well. Pt denies any needs at this time. Urinal at bedside. Bed low and locked. Call light in reach.       Jennifer Munoz RN  12/07/21 2001

## 2021-12-08 NOTE — ED NOTES
Dr. Charis Madison notified Pt Covid +, Pt placed in precautions.       Vilma Meza RN  12/07/21 2002

## 2021-12-08 NOTE — ED NOTES
Pulse ox given to Pt and Pt educated on use. Pt also educated to monitor 02 level and to return to the ER for any concerns. Pt sts understanding.       Irina Young RN  12/07/21 1587

## 2021-12-08 NOTE — CARE COORDINATION
Care Transition phone outreach s/p acute care visit 12.7.21  Left HIPAA compliant vm requesting return callback. Provided & repeated direct contact number to reach this nurse.

## 2021-12-09 NOTE — ED PROVIDER NOTES
[address] ED      CHIEF COMPLAINT  Cough (since sunday, coughing up flem, chest heaviness. ) and Chest Pain       HISTORY OF PRESENT ILLNESS  Yu Bhardwaj is a 60 y.o. male  who presents to the ED complaining of chills and body aches. Symptoms started this past Sunday. He also has a productive cough. He started having some chest tightness this morning, states that he feels like \"a cold. \"  He denies any chest pain, it is nonexertional.  It is not pleuritic. He denies any associated shortness of breath. He is a smoker, does not use inhalers at home. He denies known fevers. He is Covid vaccinated. He denies abdominal pain, vomiting, diarrhea, sore throat or nasal drainage. No other complaints, modifying factors or associated symptoms. I have reviewed the following from the nursing documentation. History reviewed. No pertinent past medical history. History reviewed. No pertinent surgical history. History reviewed. No pertinent family history. Social History     Socioeconomic History    Marital status:      Spouse name: Not on file    Number of children: Not on file    Years of education: Not on file    Highest education level: Not on file   Occupational History    Not on file   Tobacco Use    Smoking status: Current Every Day Smoker    Smokeless tobacco: Never Used   Substance and Sexual Activity    Alcohol use: Not on file    Drug use: Not on file    Sexual activity: Not on file   Other Topics Concern    Not on file   Social History Narrative    Not on file     Social Determinants of Health     Financial Resource Strain:     Difficulty of Paying Living Expenses: Not on file   Food Insecurity:     Worried About Running Out of Food in the Last Year: Not on file    Wilian of Food in the Last Year: Not on file   Transportation Needs:     Lack of Transportation (Medical): Not on file    Lack of Transportation (Non-Medical):  Not on file   Physical Activity:  Days of Exercise per Week: Not on file    Minutes of Exercise per Session: Not on file   Stress:     Feeling of Stress : Not on file   Social Connections:     Frequency of Communication with Friends and Family: Not on file    Frequency of Social Gatherings with Friends and Family: Not on file    Attends Anglican Services: Not on file    Active Member of [address] [place] [address] [employer] or Organizations: Not on file    Attends Club or Organization Meetings: Not on file    Marital Status: Not on file   Intimate Partner Violence:     Fear of Current or Ex-Partner: Not on file    Emotionally Abused: Not on file    Physically Abused: Not on file    Sexually Abused: Not on file   Housing Stability:     Unable to Pay for Housing in the Last Year: Not on file    Number of [place] in the Last Year: Not on file    Unstable Housing in the Last Year: Not on file     No current facility-administered medications for this encounter. Current Outpatient Medications   Medication Sig Dispense Refill    albuterol sulfate HFA (VENTOLIN HFA) 108 (90 Base) MCG/ACT inhaler Inhale 2 puffs into the lungs every 3 hours as needed for Wheezing 54 g 1    amLODIPine (NORVASC) 5 MG tablet TAKE 1 TABLET BY MOUTH EVERY DAY      aspirin 81 MG chewable tablet Take by mouth       No Known Allergies    REVIEW OF SYSTEMS  10 systems reviewed, pertinent positives per HPI otherwise noted to be negative. PHYSICAL EXAM  /86   Pulse 82   Temp 98 °F (36.7 °C) (Temporal)   Resp 20   Wt 135 lb (61.2 kg)   SpO2 100%   BMI 20.53 kg/m²    Physical exam:  General appearance: awake and cooperative. no distress. Non toxic appearing. Skin: Warm and dry. No rashes or lesions. HENT: Normocephalic. Atraumatic. Mucus membranes are moist.   Neck: supple  Eyes: VIJAY. EOM intact. Heart: RRR. No murmurs. Lungs: Respirations unlabored. Breath sounds mildly diminished diffusely. No wheezes, rales, or rhonchi. fair air exchange  Abdomen: No tenderness. Soft. Non distended. No peritoneal signs. Musculoskeletal: No extremity edema. Compartments soft. No deformity. No tenderness in the extremities. All extremities neurovascularly intact. Radial, Dp, and PT pulses +2/4 bilaterally  Neurological: Alert and oriented. No focal deficits. No aphasia or dysarthria. No gait ataxia. Psychiatric: Normal mood and affect. LABS  I have reviewed all labs for this visit.    Results for orders placed or performed during the hospital encounter of 12/07/21   COVID-19 & Influenza Combo    Specimen: Nasopharyngeal Swab; Nasal   Result Value Ref Range    SARS-CoV-2 RNA, RT PCR DETECTED (A) NOT DETECTED    INFLUENZA A NOT DETECTED NOT DETECTED    INFLUENZA B NOT DETECTED NOT DETECTED   CBC Auto Differential   Result Value Ref Range    WBC 5.3 4.0 - 11.0 K/uL    RBC 4.35 4.20 - 5.90 M/uL    Hemoglobin 14.5 13.5 - 17.5 g/dL    Hematocrit 43.8 40.5 - 52.5 %    .7 (H) 80.0 - 100.0 fL    MCH 33.3 26.0 - 34.0 pg    MCHC 33.0 31.0 - 36.0 g/dL    RDW 12.8 12.4 - 15.4 %    Platelets 932 329 - 351 K/uL    MPV 9.3 5.0 - 10.5 fL    PLATELET SLIDE REVIEW see below     SLIDE REVIEW see below     Path Consult No     Neutrophils % 55.0 %    Lymphocytes % 20.0 %    Monocytes % 14.0 %    Eosinophils % 3.0 %    Basophils % 1.0 %    Neutrophils Absolute 3.3 1.7 - 7.7 K/uL    Lymphocytes Absolute 1.1 1.0 - 5.1 K/uL    Monocytes Absolute 0.7 0.0 - 1.3 K/uL    Eosinophils Absolute 0.2 0.0 - 0.6 K/uL    Basophils Absolute 0.1 0.0 - 0.2 K/uL    Bands Relative 7 0 - 7 %   Comprehensive Metabolic Panel w/ Reflex to MG   Result Value Ref Range    Sodium 133 (L) 136 - 145 mmol/L    Potassium reflex Magnesium 4.4 3.5 - 5.1 mmol/L    Chloride 99 99 - 110 mmol/L    CO2 24 21 - 32 mmol/L    Anion Gap 10 3 - 16    Glucose 110 (H) 70 - 99 mg/dL    BUN 14 7 - 20 mg/dL    CREATININE 0.7 (L) 0.8 - 1.3 mg/dL    GFR Non-African American >60 >60    GFR African American >60 >60    Calcium 9.3 8.3 - 10.6 mg/dL Total Protein 7.3 6.4 - 8.2 g/dL    Albumin 4.2 3.4 - 5.0 g/dL    Albumin/Globulin Ratio 1.4 1.1 - 2.2    Total Bilirubin 0.3 0.0 - 1.0 mg/dL    Alkaline Phosphatase 79 40 - 129 U/L    ALT 23 10 - 40 U/L    AST 32 15 - 37 U/L   Troponin   Result Value Ref Range    Troponin <0.01 <0.01 ng/mL   EKG 12 Lead   Result Value Ref Range    Ventricular Rate 83 BPM    Atrial Rate 83 BPM    P-R Interval 120 ms    QRS Duration 90 ms    Q-T Interval 392 ms    QTc Calculation (Bazett) 460 ms    P Axis 75 degrees    R Axis 50 degrees    T Axis 64 degrees    Diagnosis       Normal sinus rhythmMinimal voltage criteria for LVH, may be normal variantBorderline ECGNo previous ECGs availableConfirmed by SURINDER ESCOBEDO MD ([id number]) on 12/8/2021 9:08:38 AM       ECG  The Ekg interpreted by me shows  normal sinus rhythm with a rate of 83  Axis is   Normal  QTc is  within an acceptable range  Intervals and Durations are unremarkable. ST Segments: normal    RADIOLOGY  XR CHEST PORTABLE    Result Date: 12/7/2021  EXAMINATION: ONE XRAY VIEW OF THE CHEST 12/7/2021 6:17 pm COMPARISON: None. HISTORY: ORDERING SYSTEM PROVIDED HISTORY: chest pain TECHNOLOGIST PROVIDED HISTORY: Reason for exam:->chest pain Reason for Exam: cough, chest pain for a few days Acuity: Unknown Type of Exam: Unknown FINDINGS: The lungs are without acute focal process. There is no effusion or pneumothorax. The cardiomediastinal silhouette is without acute process. The osseous structures are without acute process. No acute process. ED COURSE/MDM  Patient seen and evaluated. Old records reviewed. Labs and imaging reviewed and results discussed with patient. This is a 76-year-old male presenting for evaluation of chills and body aches. Also has a cough. Vital signs are within normal limits he is 99% on room air. He has breath sounds diminished diffusely, he is a smoker and he is given a DuoNeb. This essentially resolves his chest tightness.   His EKG is nonischemic and troponin is negative. I do not feel that his chest pain is cardiac in nature, likely related to bronchospasm from Covid. Otherwise lab work is unremarkable except for the fact that he is Covid positive. Chest x-ray is clear. I feel he is appropriate for discharge home, he is comfortable with this. He is given symptomatic care instructions as well as a home pulse ox. Also given inhaler. He is told to follow-up with primary care and given strict return precautions back to the ED and voices understanding. During the patient's ED course, the patient was given:  Medications   ipratropium-albuterol (DUONEB) nebulizer solution 1 ampule (1 ampule Inhalation Given 12/7/21 1909)        CLINICAL IMPRESSION  1. COVID-19 virus infection    2. Bronchospasm        Blood pressure 129/86, pulse 82, temperature 98 °F (36.7 °C), temperature source Temporal, resp. rate 20, weight 135 lb (61.2 kg), SpO2 100 %. Patient was given scripts for the following medications. I counseled patient how to take these medications. Discharge Medication List as of 12/7/2021  9:44 PM      START taking these medications    Details   albuterol sulfate HFA (VENTOLIN HFA) 108 (90 Base) MCG/ACT inhaler Inhale 2 puffs into the lungs every 3 hours as needed for Wheezing, Disp-54 g, R-1Normal             Follow-up with:  Mayra Gutierrez PA-C  [facility] [address]  [phone]    Call in 1 day        DISCLAIMER: This chart was created using Dragon dictation software. Efforts were made by me to ensure accuracy, however some errors may be present due to limitations of this technology and occasionally words are not transcribed correctly.        Rogelio LouieMoody Hospitalcindy  12/08/21 7557

## 2021-12-09 NOTE — CARE COORDINATION
cont'd attempt to reach for care transition review. Mailbox FULL; unable to leave message. Note pt does have Couple set up, but has not viewed Covid results as yet. Left message via vm for Karen, pt's wife listed on HIPAA. Advised this is 3rd and final attempt to reach intended. Further advised primary number for intended notes vm is FULL ;  Mentioned also individual is advised to view Couple for updated information. Encouraged intended to follow with PCP as appropriate. No further outreach planned at this time.

## 2021-12-28 NOTE — FLOWSHEET NOTE
[address] [facility], [address] [place] [address]  Phone [phone]  Fax [phone]    Physical Therapy  Cancellation/No-show Note/Discharge Summary    Patient Name:  Cristela Foley  :  1955   Date:  2021  Cancels to Date: 1  No-shows to Date: 0    For today's appointment patient:  [x]  Cancelled  []  Rescheduled appointment  []  No-show     Reason given by patient:  []  Patient ill  []  Conflicting appointment  []  No transportation    []  Conflict with work  []  No reason given  [x]  Other:     Comments:  Patient states that he is doing great and no longer needs PT treatment. He plans to continue with his HEP independently, and requests d/c from PT at this time. Please see note dated 21 for patient status at last appt. Unable to assess goals due to non-attendance for last appt. D/C from PT.      Electronically signed by:  Lake Saunders, PT, MPT [id number] Fax received from [facility] 12/07/21 for review and signature.  Put in Dr. Thomas's in basket.

## 2022-03-08 ENCOUNTER — MED ADMIN CHARGE (OUTPATIENT)
Age: 67
End: 2022-03-08

## 2022-03-08 ENCOUNTER — APPOINTMENT (OUTPATIENT)
Dept: FAMILY MEDICINE | Facility: CLINIC | Age: 67
End: 2022-03-08
Payer: MEDICARE

## 2022-03-08 VITALS
HEART RATE: 84 BPM | BODY MASS INDEX: 36.36 KG/M2 | OXYGEN SATURATION: 96 % | TEMPERATURE: 97.5 F | HEIGHT: 70 IN | RESPIRATION RATE: 16 BRPM | WEIGHT: 254 LBS | SYSTOLIC BLOOD PRESSURE: 108 MMHG | DIASTOLIC BLOOD PRESSURE: 80 MMHG

## 2022-03-08 DIAGNOSIS — R73.9 HYPERGLYCEMIA, UNSPECIFIED: ICD-10-CM

## 2022-03-08 DIAGNOSIS — Z23 ENCOUNTER FOR IMMUNIZATION: ICD-10-CM

## 2022-03-08 PROCEDURE — 90471 IMMUNIZATION ADMIN: CPT

## 2022-03-08 PROCEDURE — 99214 OFFICE O/P EST MOD 30 MIN: CPT | Mod: 25

## 2022-03-08 PROCEDURE — 36415 COLL VENOUS BLD VENIPUNCTURE: CPT

## 2022-03-08 PROCEDURE — 90715 TDAP VACCINE 7 YRS/> IM: CPT

## 2022-03-08 NOTE — REVIEW OF SYSTEMS
[Negative] : Genitourinary [Palpitations] : palpitations [Chest Pain] : no chest pain [Headache] : no headache [Dizziness] : no dizziness [FreeTextEntry5] : episodes of palpitations-saw Dr. Kim-has atrial fib

## 2022-03-08 NOTE — PLAN
[FreeTextEntry1] : Stable exam. \par \par Will follow up labwork drawn in office today.\par Needs to schedule Cardio follow-up. \par \par Received Tdap.\par Advised Mr. PRAVEEN YADAV they may experience some soreness, tenderness at the injection site.  Advised to call office if  not improving.  Mr. YADAV expressed understanding.\par

## 2022-03-08 NOTE — HISTORY OF PRESENT ILLNESS
[FreeTextEntry1] : Here for follow-up; needs Tdap.  [de-identified] : Here for follow-up; needs Tdap. \par Has a new grandchild. \par No problem with vaccines in the past. \par Had COVID in April of 2020. \par \par Ate nuts 2 hours, keto bar. Had breakfast shake in the AM. \par \par Had surgery for trigeminal neuralgia several years ago. \par Palpitations have been ongoing.  Has been checking on his apple watch.    \par Saw Cardiologist 6-8 months ago; wore a monitor. \par Patient has cardiac stent. Brother has to have an ablation.  \par \par Medications and allergies reviewed.\par

## 2022-03-08 NOTE — PHYSICAL EXAM
[Normal Oropharynx] : the oropharynx was normal [Normal Rate] : normal rate  [No Edema] : there was no peripheral edema [No Extremity Clubbing/Cyanosis] : no extremity clubbing/cyanosis [Normal] : affect was normal and insight and judgment were intact

## 2022-03-09 LAB
ALBUMIN SERPL ELPH-MCNC: 4.7 G/DL
ALP BLD-CCNC: 40 U/L
ALT SERPL-CCNC: 27 U/L
ANION GAP SERPL CALC-SCNC: 12 MMOL/L
AST SERPL-CCNC: 21 U/L
BASOPHILS # BLD AUTO: 0.03 K/UL
BASOPHILS NFR BLD AUTO: 0.5 %
BILIRUB SERPL-MCNC: 0.4 MG/DL
BUN SERPL-MCNC: 23 MG/DL
CALCIUM SERPL-MCNC: 10 MG/DL
CHLORIDE SERPL-SCNC: 106 MMOL/L
CHOLEST SERPL-MCNC: 116 MG/DL
CO2 SERPL-SCNC: 24 MMOL/L
CREAT SERPL-MCNC: 0.88 MG/DL
EGFR: 95 ML/MIN/1.73M2
EOSINOPHIL # BLD AUTO: 0.13 K/UL
EOSINOPHIL NFR BLD AUTO: 2.3 %
ESTIMATED AVERAGE GLUCOSE: 111 MG/DL
GLUCOSE SERPL-MCNC: 88 MG/DL
HBA1C MFR BLD HPLC: 5.5 %
HCT VFR BLD CALC: 42.5 %
HDLC SERPL-MCNC: 35 MG/DL
HGB BLD-MCNC: 14.5 G/DL
IMM GRANULOCYTES NFR BLD AUTO: 0.2 %
LDLC SERPL CALC-MCNC: 61 MG/DL
LYMPHOCYTES # BLD AUTO: 1.87 K/UL
LYMPHOCYTES NFR BLD AUTO: 33.6 %
MAN DIFF?: NORMAL
MCHC RBC-ENTMCNC: 33.6 PG
MCHC RBC-ENTMCNC: 34.1 GM/DL
MCV RBC AUTO: 98.4 FL
MONOCYTES # BLD AUTO: 0.71 K/UL
MONOCYTES NFR BLD AUTO: 12.7 %
NEUTROPHILS # BLD AUTO: 2.82 K/UL
NEUTROPHILS NFR BLD AUTO: 50.7 %
NONHDLC SERPL-MCNC: 81 MG/DL
PLATELET # BLD AUTO: 214 K/UL
POTASSIUM SERPL-SCNC: 4.8 MMOL/L
PROT SERPL-MCNC: 6.7 G/DL
RBC # BLD: 4.32 M/UL
RBC # FLD: 12.7 %
SODIUM SERPL-SCNC: 142 MMOL/L
TRIGL SERPL-MCNC: 102 MG/DL
TSH SERPL-ACNC: 2.04 UIU/ML
WBC # FLD AUTO: 5.57 K/UL

## 2022-03-10 LAB — URATE SERPL-MCNC: 4.6 MG/DL

## 2022-05-04 ENCOUNTER — OFFICE VISIT (OUTPATIENT)
Dept: ORTHOPEDIC SURGERY | Age: 67
End: 2022-05-04
Payer: MEDICARE

## 2022-05-04 VITALS — BODY MASS INDEX: 20.46 KG/M2 | HEIGHT: 68 IN | WEIGHT: 135 LBS

## 2022-05-04 DIAGNOSIS — M43.10 DEGENERATIVE SPONDYLOLISTHESIS: ICD-10-CM

## 2022-05-04 DIAGNOSIS — M51.36 DDD (DEGENERATIVE DISC DISEASE), LUMBAR: Primary | ICD-10-CM

## 2022-05-04 PROCEDURE — G8420 CALC BMI NORM PARAMETERS: HCPCS | Performed by: ORTHOPAEDIC SURGERY

## 2022-05-04 PROCEDURE — G8427 DOCREV CUR MEDS BY ELIG CLIN: HCPCS | Performed by: ORTHOPAEDIC SURGERY

## 2022-05-04 PROCEDURE — 4004F PT TOBACCO SCREEN RCVD TLK: CPT | Performed by: ORTHOPAEDIC SURGERY

## 2022-05-04 PROCEDURE — 99214 OFFICE O/P EST MOD 30 MIN: CPT | Performed by: ORTHOPAEDIC SURGERY

## 2022-05-04 PROCEDURE — 4040F PNEUMOC VAC/ADMIN/RCVD: CPT | Performed by: ORTHOPAEDIC SURGERY

## 2022-05-04 PROCEDURE — 1123F ACP DISCUSS/DSCN MKR DOCD: CPT | Performed by: ORTHOPAEDIC SURGERY

## 2022-05-04 PROCEDURE — 3017F COLORECTAL CA SCREEN DOC REV: CPT | Performed by: ORTHOPAEDIC SURGERY

## 2022-05-04 NOTE — PROGRESS NOTES
Follow up: SPINE    CHIEF COMPLAINT:    Chief Complaint   Patient presents with    Back Pain     Patient would like to discuss surgical options. Patient has had ALEXANDRIA's wth little relief. HISTORY OF PRESENT ILLNESS:                The patient is a 73 y.o. male here to follow-up to review lumbar MRI for chronic low back and bilateral leg pain. He reports chronic aching low back pain radiating to the bilateral posterior thighs, rarely to the calf. Symptoms have been increasing over the last  4 to 5 years and are substantially worse since his last MRI about a year ago. Periodically he will have some numbness and tingling affecting his lower extremities. His pain is increased with walking and standing; improved with sitting and resting. Conservative care includes physical therapy, MDP, NSAIDs, ALEX[place]RIA, and HEP. Ryan Marquis He denies any progressive extremity weakness. No recent bowel or bladder dysfunction or saddle anesthesia. Current/Past Treatment:   · Physical Therapy: Yes since 1/7/2021 to present  · Chiropractic: None  · Injection: SI x1 which was very helpful  Medications:            NSAIDS: Aleve            Muscle relaxer:              Steriods:   MDP            Neuropathic medications:              Opioids:             Other:   · Surgery/Consult: No    Work Status/Functionality: Remodeling homes    Past Medical History: Medical history form was reviewed today & scanned into the media tab  History reviewed. No pertinent past medical history. Past Surgical History:     History reviewed. No pertinent surgical history.   Current Medications:     Current Outpatient Medications:     albuterol sulfate HFA (VENTOLIN HFA) 108 (90 Base) MCG/ACT inhaler, Inhale 2 puffs into the lungs every 3 hours as needed for Wheezing, Disp: 54 g, Rfl: 1    amLODIPine (NORVASC) 5 MG tablet, TAKE 1 TABLET BY MOUTH EVERY DAY, Disp: , Rfl:     aspirin 81 MG chewable tablet, Take by mouth, Disp: , Rfl:   Allergies:  Patient has no known allergies. Social History:    reports that he has been smoking. He has never used smokeless tobacco.  Family History:   History reviewed. No pertinent family history. REVIEW OF SYSTEMS: Full ROS noted & scanned   CONSTITUTIONAL: Denies unexplained weight loss, fevers, chills or fatigue  NEUROLOGICAL: Denies unsteady gait or progressive weakness    PHYSICAL EXAM:    Vitals: Height 5' 8\" (1.727 m), weight 135 lb (61.2 kg). Pain scale 6/10    GENERAL EXAM:  · General Apparence: Patient is adequately groomed with no evidence of malnutrition. · Orientation: The patient is oriented to time, place and person. · Mood & Affect:The patient's mood and affect are appropriate   · Lymphatic: The lymphatic examination bilaterally reveals all areas to be without enlargement or induration  · Sensation: Sensation is intact without deficit  · Coordination/Balance: Good coordination   ·   LUMBAR/SACRAL EXAMINATION:  · Inspection: Local inspection shows no step-off or bruising. · Palpation:   No evidence of tenderness at the midline. No tenderness bilaterally at the paraspinal or trochanters. There is no step-off or paraspinal spasm. · Range of Motion: 45 degrees of flexion, 10 degrees of extension more pain with extension  · Strength:   Strength testing is 5/5 in all muscle groups tested. · Special Tests:   Straight leg raise and crossed SLR negative. Leg length and pelvis level.  0 out of 5 Lena's signs. · Skin: There are no rashes, ulcerations or lesions. · Reflexes: Reflexes are symmetrically 1+ with reinforcement at the patellar and trace ankle tendons. Clonus absent bilaterally at the feet. · Gait & station: Normal unassisted  · Additional Examinations:   · RIGHT LOWER EXTREMITY: Inspection/examination of the right lower extremity does not show any tenderness, deformity or injury. Range of motion is full. There is no gross instability. There are no rashes, ulcerations or lesions.  Strength and tone are normal.  ·   · LEFT LOWER EXTREMITY:  Inspection/examination of the left lower extremity does not show any tenderness, deformity or injury. Range of motion is full. There is no gross instability. There are no rashes, ulcerations or lesions. Strength and tone are normal.    Diagnostic Testing:    Lumbar MRI scan report independently reviewed from March 2021 to spondylolisthesis L4-L5 with severe central stenosis and a right paracentral disc herniation L5-S1    Lumbar x-rays films and report independently reviewed June 2020 showing L4-5 spondylolisthesis, severe L5-S1 DDD    2 views lumbar spine flexion extension 11/12/2020 no high-grade instability L4-5 spondylolisthesis        Impression:  Denerative spondylolisthesis L4-L5 with central stenosis and neurogenic claudication    Right paracentral disc herniation L5-S1      Plan  Discussed treatment options including observation, additional epidural injections and microlumbar decompression. We discussed the risks, benefits, and alternatives to surgery including the risks of nerve or vessel injury, paralysis, spinal blood clot, spinal fluid leak, death, infection, need for additional spinal surgery adjacent level arthritis failure of surgery to alleviate his symptoms and worse symptoms following surgery. He understands these risks. His questions were answered. We will repeat his lumbar MRI scan to be certain he still a candidate for spinal surgery.

## 2022-05-06 ENCOUNTER — TELEPHONE (OUTPATIENT)
Dept: ORTHOPEDIC SURGERY | Age: 67
End: 2022-05-06

## 2022-05-06 ENCOUNTER — HOSPITAL ENCOUNTER (OUTPATIENT)
Dept: MRI IMAGING | Age: 67
Discharge: HOME | End: 2022-05-06
Payer: MEDICARE

## 2022-05-06 DIAGNOSIS — M43.10 DEGENERATIVE SPONDYLOLISTHESIS: ICD-10-CM

## 2022-05-06 PROCEDURE — 72148 MRI LUMBAR SPINE W/O DYE: CPT

## 2022-05-06 NOTE — PROGRESS NOTES
Justin Plump    Age 73 y.o.    male    1955    MRN [id number]    5/27/2022  Arrival Time_____________  OR Time____________[address]     Procedure(s): MICROLUMBAR DISCECTOMY LUMBAR 5 - SACRAL 1, MICROLUMBAR LAMINECTOMY LUMBAR 4 - 5                      General   Surgeon(s):  Eun Rubin, MD      DAY ADMIT ___  SDS/OP ___  OUTPT IN BED ___        Phone [phone] (home)     PCP _____________________ Phone_________________ Epic ( ) Epic CE ( ) Appt ________    ADDITIONAL INFO __________________________________ Cardio/Consult _____________    NOTES _____________________________________________________________________    ____________________________________________________________________________    PAT APPT DATE:________ TIME: ________  FAXED QAD: _______  (__) H&P w/ Hospitalist  __________________________________________________________________________  Preop Nurse phone screen complete: _____________  (__) CBC     (__) W/ DIFF ___________     (__) Hgb A1C    ___________  (__) CHEST X RAY   __________  (__) LIPID PROFILE  ___________  (__) EKG   __________  (__) PT/PTT   ___________  (__) PFT's   __________  (__) BMP   ___________  (__) CAROTIDS  __________  (__) CMP   ___________  (__) VEIN MAPPING  __________  (__) U/A   ___________  (__) HISTORY & PHYSICAL __________  (__) URINE C & S  ___________  (__) CARDIAC CLEARANCE __________  (__) U/A W/ FLEX  ___________  (__) PULM.  CLEARANCE __________  (__) SERUM PREGNANCY ___________  (__) Check Epic DOS orders __________  (__) TYPE & SCREEN __________repeat ( ) (__)  __________________ __________  (__) ALBUMIN Bita Hatchet ___________  (__)  __________________ __________  (__) TRANSFERRIN  ___________  (__)  __________________ __________  (__) LIVER PROFILE  ___________  (__)  __________________ __________  (__) MRSA NASAL SWAB ___________  (__) URINE PREG DOS __________  (__) SED RATE  ___________  (__) BLOOD SUGAR DOS __________  (__) C-REACTIVE PROTEIN ___________    (__) VITAMIN D HYDROXY ___________  (__) BLOOD THINNERS __________    (__) ACE/ ARBS: _____________________     (__) BETABLOCKERS __________________

## 2022-05-09 ENCOUNTER — TELEPHONE (OUTPATIENT)
Dept: ORTHOPEDIC SURGERY | Age: 67
End: 2022-05-09

## 2022-05-09 NOTE — TELEPHONE ENCOUNTER
----- Message from Jason Boyd MD sent at 5/9/2022 10:09 AM EDT -----  Lumbar MRI  persistent stenosis  The surgery we discussed should help

## 2022-05-16 ENCOUNTER — ANESTHESIA EVENT (OUTPATIENT)
Dept: OPERATING ROOM | Age: 67
End: 2022-05-16
Payer: MEDICARE

## 2022-05-19 RX ORDER — HYDROCODONE BITARTRATE AND ACETAMINOPHEN 7.5; 325 MG/1; MG/1
TABLET ORAL
Status: ON HOLD | COMMUNITY
Start: 2022-04-15 | End: 2022-06-10 | Stop reason: HOSPADM

## 2022-05-19 NOTE — PROGRESS NOTES
1. Do not eat or drink anything after 12 midnight prior to surgery. This includes no water, chewing gum mints, or ice chips. You may brush your teeth and gargle the day of surgery but DO NOT SWALLOW THE WATER. 2. Please see your family doctor/pediatrician for a history and physical and/or concerning medications. Bring any test results/reports from your physician's office. If you are under the care of a heart doctor or specialist please be aware that you may be asked to see him or her for clearance. 3. You may be asked to stop blood thinners such as Coumadin, Plavix, Fragmin, and Lovenox or Anti-inflammatories such as Aspirin, Ibuprofen, Advil, and Naproxen prior to your surgery. Please check with your doctor before stopping these or any other medications. 4. Do not smoke, and do not drink any alcoholic beverages 24 hours prior to surgery. 5. You MUST make arrangements for a responsible adult to take you home after your surgery. For your safety, you will not be allowed to leave alone or drive yourself home. Your surgery will be cancelled if you do not have a ride home. Also for your safety, it is strongly suggested someone stay with you the first 24 hrs after your surgery. 6. A parent/legal guardian must accompany a child scheduled for surgery and plan to stay at the hospital until the child is discharged. Please do not bring other children with you. 7. For your comfort,please wear simple, loose fitting clothing to the hospital.  Please do not bring valuables (money, credit cards, checkbooks, etc.) Do not wear any makeup (including no eye makeup) or nail polish on your fingers or toes. 8. For your safety, please DO NOT wear any jewelry or piercings on day of surgery. All body piercing jewelry must be removed. 9. If you have dentures, they will be removed before going to the OR; for your convenience we will provide you with a container.   If you wear contact lenses or glasses, they will be removed, they will be removed, please bring a case for them. 10. If appicable,Please see your family doctor/pediatrician for a history & physical and/or concerning medications. Bring any test results/reports from your physician's office. 11. Remember to bring Blood Bank bracelet to the hospital on the day of surgery. 12. If you have a Living Will and Durable Power of  for Healthcare, please bring in a copy. 15. Notify your Surgeon if you develop any illness between now and surgery  time, cough, cold, fever, sore throat, nausea, vomiting, etc.  Please notify your surgeon if you experience dizziness, shortness of breath or blurred vision between now & the time of your surgery   14. DO NOT shave your operative site 96 hours prior to surgery. For face & neck surgery, men may use an electric razor 48 hours prior to surgery. 15. Shower the night before surgery with ___Antibacterial soap ___Hibiclens   16. To provide excellent care visitors will be limited to one in the room at any given time. 17.  Please bring picture ID and insurance card. 18.  Visit our web site for additional information:  [email or web address].

## 2022-05-19 NOTE — PROGRESS NOTES
Obstructive Sleep Apnea (KAYLEY) Screening     Patient:  Ashish Godoy    YOB: 1955      Medical Record #:  [id number]                     Date:  5/19/2022     1. Are you a loud and/or regular snorer? []  Yes       [x] No    2. Have you been observed to gasp or stop breathing during sleep? []  Yes       [x] No    3. Do you feel tired or groggy upon awakening or do you awaken with a headache?           []  Yes       [x] No    4. Are you often tired or fatigued during the wake time hours? [x]  Yes       [] No    5. Do you fall asleep sitting, reading, watching TV or driving? [x]  Yes       [] No    6. Do you often have problems with memory or concentration? []  Yes       [x] No    If patient's KAYLEY score if greater than or equal to 3, they are considered high risk for KAYLEY. An anesthesia provider will evaluate the patient and develop a plan of care the day of surgery. Note:  If the patient's BMI is more than 35 kg m¯² , has neck circumference > 40 cm, and/or high blood pressure the risk is greater (© American Sleep Apnea Association, 2006).

## 2022-05-27 ENCOUNTER — ANESTHESIA (OUTPATIENT)
Dept: OPERATING ROOM | Age: 67
End: 2022-05-27
Payer: MEDICARE

## 2022-06-10 ENCOUNTER — HOSPITAL ENCOUNTER (OUTPATIENT)
Age: 67
Setting detail: OUTPATIENT SURGERY
Discharge: HOME | End: 2022-06-10
Attending: ORTHOPAEDIC SURGERY | Admitting: ORTHOPAEDIC SURGERY
Payer: MEDICARE

## 2022-06-10 ENCOUNTER — APPOINTMENT (OUTPATIENT)
Dept: GENERAL RADIOLOGY | Age: 67
End: 2022-06-10
Attending: ORTHOPAEDIC SURGERY
Payer: MEDICARE

## 2022-06-10 VITALS
WEIGHT: 135 LBS | DIASTOLIC BLOOD PRESSURE: 77 MMHG | OXYGEN SATURATION: 100 % | TEMPERATURE: 96.6 F | BODY MASS INDEX: 21.19 KG/M2 | RESPIRATION RATE: 11 BRPM | HEART RATE: 62 BPM | SYSTOLIC BLOOD PRESSURE: 143 MMHG | HEIGHT: 67 IN

## 2022-06-10 DIAGNOSIS — M48.062 SPINAL STENOSIS OF LUMBAR REGION WITH NEUROGENIC CLAUDICATION: Primary | ICD-10-CM

## 2022-06-10 LAB
ANION GAP SERPL CALCULATED.3IONS-SCNC: 11 MMOL/L (ref 3–16)
BUN BLDV-MCNC: 15 MG/DL (ref 7–20)
CALCIUM SERPL-MCNC: 9 MG/DL (ref 8.3–10.6)
CHLORIDE BLD-SCNC: 106 MMOL/L (ref 99–110)
CO2: 24 MMOL/L (ref 21–32)
CREAT SERPL-MCNC: 0.6 MG/DL (ref 0.8–1.3)
EKG ATRIAL RATE: 71 BPM
EKG DIAGNOSIS: NORMAL
EKG P AXIS: 63 DEGREES
EKG P-R INTERVAL: 132 MS
EKG Q-T INTERVAL: 410 MS
EKG QRS DURATION: 90 MS
EKG QTC CALCULATION (BAZETT): 445 MS
EKG R AXIS: 30 DEGREES
EKG T AXIS: 43 DEGREES
EKG VENTRICULAR RATE: 71 BPM
GFR AFRICAN AMERICAN: >60
GFR NON-AFRICAN AMERICAN: >60
GLUCOSE BLD-MCNC: 97 MG/DL (ref 70–99)
HCT VFR BLD CALC: 37.9 % (ref 40.5–52.5)
HEMOGLOBIN: 12.8 G/DL (ref 13.5–17.5)
MCH RBC QN AUTO: 33.6 PG (ref 26–34)
MCHC RBC AUTO-ENTMCNC: 33.8 G/DL (ref 31–36)
MCV RBC AUTO: 99.4 FL (ref 80–100)
PDW BLD-RTO: 12.8 % (ref 12.4–15.4)
PLATELET # BLD: 219 K/UL (ref 135–450)
PMV BLD AUTO: 7.4 FL (ref 5–10.5)
POTASSIUM REFLEX MAGNESIUM: 3.6 MMOL/L (ref 3.5–5.1)
RBC # BLD: 3.82 M/UL (ref 4.2–5.9)
SODIUM BLD-SCNC: 141 MMOL/L (ref 136–145)
WBC # BLD: 5.8 K/UL (ref 4–11)

## 2022-06-10 PROCEDURE — 6370000000 HC RX 637 (ALT 250 FOR IP): Performed by: ANESTHESIOLOGY

## 2022-06-10 PROCEDURE — 3700000001 HC ADD 15 MINUTES (ANESTHESIA): Performed by: ORTHOPAEDIC SURGERY

## 2022-06-10 PROCEDURE — 7100000000 HC PACU RECOVERY - FIRST 15 MIN: Performed by: ORTHOPAEDIC SURGERY

## 2022-06-10 PROCEDURE — 80048 BASIC METABOLIC PNL TOTAL CA: CPT

## 2022-06-10 PROCEDURE — 2580000003 HC RX 258

## 2022-06-10 PROCEDURE — 6360000002 HC RX W HCPCS

## 2022-06-10 PROCEDURE — 2580000003 HC RX 258: Performed by: ANESTHESIOLOGY

## 2022-06-10 PROCEDURE — 2709999900 HC NON-CHARGEABLE SUPPLY: Performed by: ORTHOPAEDIC SURGERY

## 2022-06-10 PROCEDURE — 93005 ELECTROCARDIOGRAM TRACING: CPT | Performed by: ORTHOPAEDIC SURGERY

## 2022-06-10 PROCEDURE — 85027 COMPLETE CBC AUTOMATED: CPT

## 2022-06-10 PROCEDURE — 2500000003 HC RX 250 WO HCPCS: Performed by: ANESTHESIOLOGY

## 2022-06-10 PROCEDURE — 6360000002 HC RX W HCPCS: Performed by: ANESTHESIOLOGY

## 2022-06-10 PROCEDURE — 2580000003 HC RX 258: Performed by: ORTHOPAEDIC SURGERY

## 2022-06-10 PROCEDURE — 72100 X-RAY EXAM L-S SPINE 2/3 VWS: CPT

## 2022-06-10 PROCEDURE — A4217 STERILE WATER/SALINE, 500 ML: HCPCS | Performed by: ORTHOPAEDIC SURGERY

## 2022-06-10 PROCEDURE — 3209999900 FLUORO FOR SURGICAL PROCEDURES

## 2022-06-10 PROCEDURE — 2500000003 HC RX 250 WO HCPCS: Performed by: ORTHOPAEDIC SURGERY

## 2022-06-10 PROCEDURE — 93010 ELECTROCARDIOGRAM REPORT: CPT | Performed by: INTERNAL MEDICINE

## 2022-06-10 PROCEDURE — 3600000015 HC SURGERY LEVEL 5 ADDTL 15MIN: Performed by: ORTHOPAEDIC SURGERY

## 2022-06-10 PROCEDURE — 7100000001 HC PACU RECOVERY - ADDTL 15 MIN: Performed by: ORTHOPAEDIC SURGERY

## 2022-06-10 PROCEDURE — 3600000005 HC SURGERY LEVEL 5 BASE: Performed by: ORTHOPAEDIC SURGERY

## 2022-06-10 PROCEDURE — 6360000002 HC RX W HCPCS: Performed by: ORTHOPAEDIC SURGERY

## 2022-06-10 PROCEDURE — 7100000010 HC PHASE II RECOVERY - FIRST 15 MIN: Performed by: ORTHOPAEDIC SURGERY

## 2022-06-10 PROCEDURE — 2500000003 HC RX 250 WO HCPCS

## 2022-06-10 PROCEDURE — 3700000000 HC ANESTHESIA ATTENDED CARE: Performed by: ORTHOPAEDIC SURGERY

## 2022-06-10 PROCEDURE — 7100000011 HC PHASE II RECOVERY - ADDTL 15 MIN: Performed by: ORTHOPAEDIC SURGERY

## 2022-06-10 RX ORDER — MEPERIDINE HYDROCHLORIDE 50 MG/ML
12.5 INJECTION INTRAMUSCULAR; INTRAVENOUS; SUBCUTANEOUS EVERY 5 MIN PRN
Status: DISCONTINUED | OUTPATIENT
Start: 2022-06-10 | End: 2022-06-10 | Stop reason: HOSPADM

## 2022-06-10 RX ORDER — OXYCODONE HYDROCHLORIDE 5 MG/1
5 TABLET ORAL PRN
Status: COMPLETED | OUTPATIENT
Start: 2022-06-10 | End: 2022-06-10

## 2022-06-10 RX ORDER — GABAPENTIN 300 MG/1
300 CAPSULE ORAL 3 TIMES DAILY
Qty: 30 CAPSULE | Refills: 0 | Status: SHIPPED | OUTPATIENT
Start: 2022-06-10 | End: 2022-06-20

## 2022-06-10 RX ORDER — LIDOCAINE HYDROCHLORIDE 20 MG/ML
INJECTION, SOLUTION INFILTRATION; PERINEURAL PRN
Status: DISCONTINUED | OUTPATIENT
Start: 2022-06-10 | End: 2022-06-10 | Stop reason: SDUPTHER

## 2022-06-10 RX ORDER — HYDROMORPHONE HCL 110MG/55ML
PATIENT CONTROLLED ANALGESIA SYRINGE INTRAVENOUS PRN
Status: DISCONTINUED | OUTPATIENT
Start: 2022-06-10 | End: 2022-06-10 | Stop reason: SDUPTHER

## 2022-06-10 RX ORDER — PROPOFOL 10 MG/ML
INJECTION, EMULSION INTRAVENOUS PRN
Status: DISCONTINUED | OUTPATIENT
Start: 2022-06-10 | End: 2022-06-10 | Stop reason: SDUPTHER

## 2022-06-10 RX ORDER — SODIUM CHLORIDE, SODIUM LACTATE, POTASSIUM CHLORIDE, CALCIUM CHLORIDE 600; 310; 30; 20 MG/100ML; MG/100ML; MG/100ML; MG/100ML
INJECTION, SOLUTION INTRAVENOUS CONTINUOUS
Status: DISCONTINUED | OUTPATIENT
Start: 2022-06-10 | End: 2022-06-10 | Stop reason: HOSPADM

## 2022-06-10 RX ORDER — LIDOCAINE HYDROCHLORIDE 10 MG/ML
2 INJECTION, SOLUTION INFILTRATION; PERINEURAL
Status: DISCONTINUED | OUTPATIENT
Start: 2022-06-10 | End: 2022-06-10 | Stop reason: HOSPADM

## 2022-06-10 RX ORDER — SODIUM CHLORIDE 0.9 % (FLUSH) 0.9 %
5-40 SYRINGE (ML) INJECTION PRN
Status: DISCONTINUED | OUTPATIENT
Start: 2022-06-10 | End: 2022-06-10 | Stop reason: HOSPADM

## 2022-06-10 RX ORDER — DOCUSATE SODIUM 100 MG/1
100 CAPSULE, LIQUID FILLED ORAL 2 TIMES DAILY PRN
Qty: 30 CAPSULE | Refills: 1 | Status: SHIPPED | OUTPATIENT
Start: 2022-06-10

## 2022-06-10 RX ORDER — BUPIVACAINE HYDROCHLORIDE AND EPINEPHRINE 2.5; 5 MG/ML; UG/ML
INJECTION, SOLUTION EPIDURAL; INFILTRATION; INTRACAUDAL; PERINEURAL PRN
Status: DISCONTINUED | OUTPATIENT
Start: 2022-06-10 | End: 2022-06-10 | Stop reason: HOSPADM

## 2022-06-10 RX ORDER — DIPHENHYDRAMINE HYDROCHLORIDE 50 MG/ML
12.5 INJECTION INTRAMUSCULAR; INTRAVENOUS
Status: DISCONTINUED | OUTPATIENT
Start: 2022-06-10 | End: 2022-06-10 | Stop reason: HOSPADM

## 2022-06-10 RX ORDER — MAGNESIUM SULFATE IN WATER 40 MG/ML
2000 INJECTION, SOLUTION INTRAVENOUS ONCE
Status: COMPLETED | OUTPATIENT
Start: 2022-06-10 | End: 2022-06-10

## 2022-06-10 RX ORDER — SODIUM CHLORIDE 0.9 % (FLUSH) 0.9 %
5-40 SYRINGE (ML) INJECTION EVERY 12 HOURS SCHEDULED
Status: DISCONTINUED | OUTPATIENT
Start: 2022-06-10 | End: 2022-06-10 | Stop reason: HOSPADM

## 2022-06-10 RX ORDER — ROCURONIUM BROMIDE 10 MG/ML
INJECTION, SOLUTION INTRAVENOUS PRN
Status: DISCONTINUED | OUTPATIENT
Start: 2022-06-10 | End: 2022-06-10 | Stop reason: SDUPTHER

## 2022-06-10 RX ORDER — PHENYLEPHRINE HCL IN 0.9% NACL 1 MG/10 ML
SYRINGE (ML) INTRAVENOUS PRN
Status: DISCONTINUED | OUTPATIENT
Start: 2022-06-10 | End: 2022-06-10 | Stop reason: SDUPTHER

## 2022-06-10 RX ORDER — SODIUM CHLORIDE, SODIUM LACTATE, POTASSIUM CHLORIDE, CALCIUM CHLORIDE 600; 310; 30; 20 MG/100ML; MG/100ML; MG/100ML; MG/100ML
INJECTION, SOLUTION INTRAVENOUS CONTINUOUS PRN
Status: DISCONTINUED | OUTPATIENT
Start: 2022-06-10 | End: 2022-06-10 | Stop reason: SDUPTHER

## 2022-06-10 RX ORDER — SODIUM CHLORIDE 9 MG/ML
INJECTION, SOLUTION INTRAVENOUS PRN
Status: DISCONTINUED | OUTPATIENT
Start: 2022-06-10 | End: 2022-06-10 | Stop reason: HOSPADM

## 2022-06-10 RX ORDER — OXYCODONE HYDROCHLORIDE AND ACETAMINOPHEN 5; 325 MG/1; MG/1
2 TABLET ORAL EVERY 4 HOURS PRN
Qty: 40 TABLET | Refills: 0 | Status: SHIPPED | OUTPATIENT
Start: 2022-06-10 | End: 2022-07-01

## 2022-06-10 RX ORDER — ONDANSETRON 2 MG/ML
4 INJECTION INTRAMUSCULAR; INTRAVENOUS
Status: DISCONTINUED | OUTPATIENT
Start: 2022-06-10 | End: 2022-06-10 | Stop reason: HOSPADM

## 2022-06-10 RX ORDER — DEXAMETHASONE SODIUM PHOSPHATE 4 MG/ML
INJECTION, SOLUTION INTRA-ARTICULAR; INTRALESIONAL; INTRAMUSCULAR; INTRAVENOUS; SOFT TISSUE PRN
Status: DISCONTINUED | OUTPATIENT
Start: 2022-06-10 | End: 2022-06-10 | Stop reason: SDUPTHER

## 2022-06-10 RX ORDER — FAMOTIDINE 10 MG/ML
20 INJECTION, SOLUTION INTRAVENOUS ONCE
Status: COMPLETED | OUTPATIENT
Start: 2022-06-10 | End: 2022-06-10

## 2022-06-10 RX ORDER — SODIUM CHLORIDE 9 MG/ML
25 INJECTION, SOLUTION INTRAVENOUS PRN
Status: DISCONTINUED | OUTPATIENT
Start: 2022-06-10 | End: 2022-06-10 | Stop reason: HOSPADM

## 2022-06-10 RX ORDER — OXYCODONE HYDROCHLORIDE 5 MG/1
10 TABLET ORAL PRN
Status: COMPLETED | OUTPATIENT
Start: 2022-06-10 | End: 2022-06-10

## 2022-06-10 RX ORDER — FENTANYL CITRATE 50 UG/ML
INJECTION, SOLUTION INTRAMUSCULAR; INTRAVENOUS PRN
Status: DISCONTINUED | OUTPATIENT
Start: 2022-06-10 | End: 2022-06-10 | Stop reason: SDUPTHER

## 2022-06-10 RX ORDER — LABETALOL HYDROCHLORIDE 5 MG/ML
10 INJECTION, SOLUTION INTRAVENOUS
Status: DISCONTINUED | OUTPATIENT
Start: 2022-06-10 | End: 2022-06-10 | Stop reason: HOSPADM

## 2022-06-10 RX ORDER — ONDANSETRON 2 MG/ML
INJECTION INTRAMUSCULAR; INTRAVENOUS PRN
Status: DISCONTINUED | OUTPATIENT
Start: 2022-06-10 | End: 2022-06-10 | Stop reason: SDUPTHER

## 2022-06-10 RX ORDER — MIDAZOLAM HYDROCHLORIDE 1 MG/ML
INJECTION INTRAMUSCULAR; INTRAVENOUS PRN
Status: DISCONTINUED | OUTPATIENT
Start: 2022-06-10 | End: 2022-06-10 | Stop reason: SDUPTHER

## 2022-06-10 RX ADMIN — LIDOCAINE HYDROCHLORIDE 80 MG: 20 INJECTION, SOLUTION INFILTRATION; PERINEURAL at 09:45

## 2022-06-10 RX ADMIN — METHOCARBAMOL 1000 MG: 100 INJECTION, SOLUTION INTRAMUSCULAR; INTRAVENOUS at 09:55

## 2022-06-10 RX ADMIN — PROPOFOL 160 MG: 10 INJECTION, EMULSION INTRAVENOUS at 09:45

## 2022-06-10 RX ADMIN — SUGAMMADEX 200 MG: 100 INJECTION, SOLUTION INTRAVENOUS at 11:48

## 2022-06-10 RX ADMIN — HYDROMORPHONE HYDROCHLORIDE 0.5 MG: 1 INJECTION, SOLUTION INTRAMUSCULAR; INTRAVENOUS; SUBCUTANEOUS at 12:29

## 2022-06-10 RX ADMIN — MAGNESIUM SULFATE IN WATER 2000 MG: 40 INJECTION, SOLUTION INTRAVENOUS at 10:27

## 2022-06-10 RX ADMIN — SODIUM CHLORIDE, SODIUM LACTATE, POTASSIUM CHLORIDE, AND CALCIUM CHLORIDE: .6; .31; .03; .02 INJECTION, SOLUTION INTRAVENOUS at 09:41

## 2022-06-10 RX ADMIN — CEFAZOLIN SODIUM 2 G: 10 INJECTION, POWDER, FOR SOLUTION INTRAVENOUS at 09:52

## 2022-06-10 RX ADMIN — DEXMEDETOMIDINE HYDROCHLORIDE 8 MCG: 100 INJECTION, SOLUTION INTRAVENOUS at 11:39

## 2022-06-10 RX ADMIN — ONDANSETRON 4 MG: 2 INJECTION INTRAMUSCULAR; INTRAVENOUS at 11:44

## 2022-06-10 RX ADMIN — MIDAZOLAM HYDROCHLORIDE 1 MG: 2 INJECTION, SOLUTION INTRAMUSCULAR; INTRAVENOUS at 09:43

## 2022-06-10 RX ADMIN — HYDROMORPHONE HYDROCHLORIDE 1 MG: 2 INJECTION INTRAMUSCULAR; INTRAVENOUS; SUBCUTANEOUS at 11:55

## 2022-06-10 RX ADMIN — OXYCODONE HYDROCHLORIDE 10 MG: 5 TABLET ORAL at 13:00

## 2022-06-10 RX ADMIN — DEXAMETHASONE SODIUM PHOSPHATE 8 MG: 4 INJECTION, SOLUTION INTRAMUSCULAR; INTRAVENOUS at 09:55

## 2022-06-10 RX ADMIN — ROCURONIUM BROMIDE 50 MG: 10 SOLUTION INTRAVENOUS at 09:46

## 2022-06-10 RX ADMIN — HYDROMORPHONE HYDROCHLORIDE 0.5 MG: 1 INJECTION, SOLUTION INTRAMUSCULAR; INTRAVENOUS; SUBCUTANEOUS at 12:08

## 2022-06-10 RX ADMIN — Medication 100 MCG: at 10:42

## 2022-06-10 RX ADMIN — Medication 100 MCG: at 10:53

## 2022-06-10 RX ADMIN — FAMOTIDINE 20 MG: 10 INJECTION, SOLUTION INTRAVENOUS at 08:34

## 2022-06-10 RX ADMIN — FENTANYL CITRATE 50 MCG: 50 INJECTION INTRAMUSCULAR; INTRAVENOUS at 10:03

## 2022-06-10 RX ADMIN — ROCURONIUM BROMIDE 10 MG: 10 SOLUTION INTRAVENOUS at 11:15

## 2022-06-10 RX ADMIN — FENTANYL CITRATE 50 MCG: 50 INJECTION INTRAMUSCULAR; INTRAVENOUS at 09:45

## 2022-06-10 RX ADMIN — Medication 100 MCG: at 10:37

## 2022-06-10 RX ADMIN — DEXMEDETOMIDINE HYDROCHLORIDE 20 MCG: 100 INJECTION, SOLUTION INTRAVENOUS at 10:28

## 2022-06-10 RX ADMIN — ROCURONIUM BROMIDE 20 MG: 10 SOLUTION INTRAVENOUS at 10:28

## 2022-06-10 RX ADMIN — HYDROMORPHONE HYDROCHLORIDE 0.5 MG: 1 INJECTION, SOLUTION INTRAMUSCULAR; INTRAVENOUS; SUBCUTANEOUS at 12:20

## 2022-06-10 RX ADMIN — SODIUM CHLORIDE, POTASSIUM CHLORIDE, SODIUM LACTATE AND CALCIUM CHLORIDE: 600; 310; 30; 20 INJECTION, SOLUTION INTRAVENOUS at 08:34

## 2022-06-10 ASSESSMENT — PAIN DESCRIPTION - PAIN TYPE
TYPE: ACUTE PAIN;CHRONIC PAIN;SURGICAL PAIN
TYPE: ACUTE PAIN;SURGICAL PAIN;CHRONIC PAIN
TYPE: ACUTE PAIN;CHRONIC PAIN;SURGICAL PAIN

## 2022-06-10 ASSESSMENT — PAIN DESCRIPTION - DESCRIPTORS
DESCRIPTORS: ACHING
DESCRIPTORS: BURNING

## 2022-06-10 ASSESSMENT — PAIN - FUNCTIONAL ASSESSMENT
PAIN_FUNCTIONAL_ASSESSMENT: ACTIVITIES ARE NOT PREVENTED
PAIN_FUNCTIONAL_ASSESSMENT: 0-10
PAIN_FUNCTIONAL_ASSESSMENT: ACTIVITIES ARE NOT PREVENTED

## 2022-06-10 ASSESSMENT — PAIN DESCRIPTION - ORIENTATION
ORIENTATION: LOWER

## 2022-06-10 ASSESSMENT — PAIN SCALES - GENERAL
PAINLEVEL_OUTOF10: 7
PAINLEVEL_OUTOF10: 8
PAINLEVEL_OUTOF10: 8
PAINLEVEL_OUTOF10: 6

## 2022-06-10 ASSESSMENT — PAIN DESCRIPTION - FREQUENCY
FREQUENCY: CONTINUOUS

## 2022-06-10 ASSESSMENT — PAIN DESCRIPTION - ONSET
ONSET: ON-GOING
ONSET: AWAKENED FROM SLEEP
ONSET: ON-GOING

## 2022-06-10 ASSESSMENT — PAIN DESCRIPTION - LOCATION
LOCATION: BACK

## 2022-06-10 ASSESSMENT — LIFESTYLE VARIABLES: SMOKING_STATUS: 1

## 2022-06-10 NOTE — PROGRESS NOTES
Pt up to the bathroom to void without difficulty. Tolerating PO. Discharge instructions given to pt and family. Verbalized understanding. PIV removed. Pt dressed and wheeled out and discharged to the care of their family in stable condition.    Yenifer Armas RN

## 2022-06-10 NOTE — OP NOTE
Operative Note      Patient: Reg Chambers  YOB: 1955  MRN: [id number]    Date of Procedure: 6/10/2022    Pre-Op Diagnosis: DEGENERATIVE DISC DISEASE, LUMBAR, spinal stenosis L4-5, disc herniation L5-S1    Post-Op Diagnosis: Same       Procedure(s): MICROLUMBAR DISCECTOMY LUMBAR 5 - SACRAL 1, MICROLUMBAR LAMINECTOMY LUMBAR 4 - 5    Surgeon(s):  Issac Neff MD    Assistant:   Surgical Assistant: Terrance Seals  First Assistant: Ailyn Jenkins RN    Anesthesia: General    Estimated Blood Loss (mL): 282     Complications: None    Specimens:   * No specimens in log *    Implants:  * No implants in log *      Drains: * No LDAs found *    Findings: stenosis    Detailed Description of Procedure:     INDICATIONS FOR SURGERY: Brandt Avila is a 73 y.o. male with back and right greater than left leg pain. The symptoms failed to respond to conservative intervention. An MRI scan was performed and this showed evidence of severe spinal stenosis L4-5 and a right pericentral HNP L5-S1. Having failed conservative management and experiencing persistent symptoms, the patient elected to proceed ahead with the surgical option listed above. DETAILS OF PROCEDURE: The patient was brought to the operating room and placed under general anesthesia. The patient was then placed prone on a Brian table. All bony prominences were inspected and padded prior to sterile draping. Using a #10 blade knife, the skin was incised in the midline and monopolar cautery was used to dissect through the subcutaneous tissue to open the fascia and reflect the paraspinal muscles laterally exposing the L4-5 interspace on the right side. The microscope was then brought into the field and used to assist with performing a microsurgical hemilaminotomy, partial facetectomy and foraminotomy. Using the Midas Michael drill, I burred down the hemilamina of L4 and the medial portion of the facet joint.  The underlying ligamentum flavum was freed with the micronerve hook from the underlying dura and removed with the 3 mm punch laterally, exposing the lateral dural tube and takeoff of the L5 nerve root. I carefully dissected the ligamentum flavum from the dura using a micronerve hook and removed it. I then performed a foraminotomy with a 3 mm punch. The L5 nerve root and the thecal sac were identified and noted to be without dura tears. I then made an incision in the fascia to the left of the spinous process. I then performed a left hemilaminotomy, partial facetectomy and foraminotomy at the L4-5 using the previously described method. I then performed a right hemilaminotomy, partial facetectomy and foraminotomy at the L5-S1 using the previously described method. I retracted the S1 nerve root and removed disc material with a pituitary forceps. The wound was copiously irrigated with antibiotic solution. 0.5% Marcaine in subcutaneous tissues for analgesia. The fascia was then reapproximated using interrupted 0 Vicryl sutures and interrupted 3-0 Vicryl sutures followed by a 4-0 Vicryl subcuticular suture were used to reapproximate the subcuticular layer. A sterile dressing was then applied. The patient was extubated in the operating room and transferred to the recovery room in stable condition. There were no complications. Marques Rod M.D.        Electronically signed by Sadaf Celaya MD on 6/10/2022 at 11:33 AM

## 2022-06-10 NOTE — PROGRESS NOTES
D: Patient here from [place] via stretcher, taken to bay 5 in PACU, all current drips, treatments, skin issues, and plan of care were reviewed by both RN's, patients care transferred with patient in stable condition.

## 2022-06-10 NOTE — ANESTHESIA PRE PROCEDURE
Department of Anesthesiology  Preprocedure Note       Name:  Thom Orr   Age:  73 y.o.  :  1955                                          MRN:  [id number]         Date:  6/10/2022      Surgeon: Kendy Diane):  Jason Boyd MD    Procedure: Procedure(s): MICROLUMBAR DISCECTOMY LUMBAR 5 - SACRAL 1, MICROLUMBAR LAMINECTOMY LUMBAR 4 - 5    Medications prior to admission:   Prior to Admission medications    Medication Sig Start Date End Date Taking? Authorizing Provider   oxyCODONE-acetaminophen (PERCOCET) 5-325 MG per tablet Take 2 tablets by mouth every 4 hours as needed for Pain for up to 40 doses. May take 1 tab every 4 hours prn moderate pain 6/10/22 7/1/22 Yes Jason Boyd MD   docusate sodium (COLACE) 100 mg capsule Take 1 capsule by mouth 2 times daily as needed for Constipation 6/10/22  Yes Jason Boyd MD   gabapentin (NEURONTIN) 300 MG capsule Take 1 capsule by mouth 3 times daily for 30 doses.  6/10/22 6/20/22 Yes Jason Boyd MD   albuterol sulfate HFA (VENTOLIN HFA) 108 (90 Base) MCG/ACT inhaler Inhale 2 puffs into the lungs every 3 hours as needed for Wheezing  Patient not taking: Reported on 6/10/2022 12/7/21   Мария Horn DO   amLODIPine (NORVASC) 5 MG tablet Take 5 mg by mouth daily  10/7/20   Historical Provider, MD   aspirin 81 MG chewable tablet Take by mouth 20   Historical Provider, MD       Current medications:    Current Facility-Administered Medications   Medication Dose Route Frequency Provider Last Rate Last Admin    ceFAZolin (ANCEF) 2000 mg in dextrose 5 % 100 mL IVPB  2,000 mg IntraVENous On Call to Baltazar Peace MD        lactated ringers infusion   IntraVENous Continuous Nereyda Arroyo MD        sodium chloride flush 0.9 % injection 5-40 mL  5-40 mL IntraVENous 2 times per day Nereyda Arroyo MD        sodium chloride flush 0.9 % injection 5-40 mL  5-40 mL IntraVENous PRN Nereyda Arroyo MD        0.9 % sodium chloride infusion   IntraVENous PRN Mona Aguero MD        lidocaine 1 % injection 2 mL  2 mL IntraDERmal Once PRN Karolian Blackwell MD        lactated ringers infusion   IntraVENous Continuous Karolina Blackwell  mL/hr at 06/10/22 0834 New Bag at 06/10/22 0834    methocarbamol (ROBAXIN) 500 mg in dextrose 5 % 100 mL IVPB  500 mg IntraVENous Once Pattricia Kayser, MD        magnesium sulfate 2000 mg in 50 mL IVPB premix  2,000 mg IntraVENous Once Pattricia Kayser, MD           Allergies:  No Known Allergies    Problem List:    Patient Active Problem List   Diagnosis Code    Chronic low back pain M54.50, G89.29    Elevated blood pressure reading R03.0    Essential hypertension I10    Tobacco use Z72.0       Past Medical History:        Diagnosis Date    Hypertension        Past Surgical History:  History reviewed. No pertinent surgical history.     Social History:    Social History     Tobacco Use    Smoking status: Current Every Day Smoker     Packs/day: 1.00    Smokeless tobacco: Never Used   Substance Use Topics    Alcohol use: Yes     Comment: 15-20 drinks per week                                Ready to quit: Not Answered  Counseling given: Not Answered      Vital Signs (Current):   Vitals:    05/19/22 1408 05/19/22 1528 06/10/22 0822   BP:   134/82   Pulse:   70   Resp:   16   Temp:   98.9 °F (37.2 °C)   TempSrc:   Temporal   SpO2:   99%   Weight: 135 lb (61.2 kg) 135 lb (61.2 kg)    Height: 5' 8\" (1.727 m) 5' 7\" (1.702 m)                                               BP Readings from Last 3 Encounters:   06/10/22 134/82   12/07/21 129/86       NPO Status: Time of last liquid consumption: 1800                        Time of last solid consumption: 1800                        Date of last liquid consumption: 06/09/22                        Date of last solid food consumption: 06/09/22    BMI:   Wt Readings from Last 3 Encounters:   05/19/22 135 lb (61.2 kg)   05/06/22 130 lb (59 kg)   05/04/22 135 lb (61.2 kg)     Body mass index is 21.14 kg/m². CBC:   Lab Results   Component Value Date    WBC 5.8 06/10/2022    RBC 3.82 06/10/2022    HGB 12.8 06/10/2022    HCT 37.9 06/10/2022    MCV 99.4 06/10/2022    RDW 12.8 06/10/2022     06/10/2022       CMP:   Lab Results   Component Value Date     06/10/2022    K 3.6 06/10/2022     06/10/2022    CO2 24 06/10/2022    BUN 15 06/10/2022    CREATININE 0.6 06/10/2022    GFRAA >60 06/10/2022    AGRATIO 1.4 12/07/2021    LABGLOM >60 06/10/2022    GLUCOSE 97 06/10/2022    PROT 7.3 12/07/2021    CALCIUM 9.0 06/10/2022    BILITOT 0.3 12/07/2021    ALKPHOS 79 12/07/2021    AST 32 12/07/2021    ALT 23 12/07/2021       POC Tests: No results for input(s): POCGLU, POCNA, POCK, POCCL, POCBUN, POCHEMO, POCHCT in the last 72 hours. Coags: No results found for: PROTIME, INR, APTT    HCG (If Applicable): No results found for: PREGTESTUR, PREGSERUM, HCG, HCGQUANT     ABGs: No results found for: PHART, PO2ART, DNP4PGN, XIH1ZEK, BEART, R0RMEMYX     Type & Screen (If Applicable):  No results found for: LABABO, LABRH    Drug/Infectious Status (If Applicable):  No results found for: HIV, HEPCAB    COVID-19 Screening (If Applicable):   Lab Results   Component Value Date    COVID19 DETECTED 12/07/2021           Anesthesia Evaluation   no history of anesthetic complications:   Airway: Mallampati: II  TM distance: <3 FB   Neck ROM: limited  Mouth opening: > = 3 FB   Dental:    (+) upper dentures      Pulmonary:   (+) pneumonia: resolved,  current smoker                           Cardiovascular:    (+) hypertension:,                   Neuro/Psych:   (+) neuromuscular disease:,             GI/Hepatic/Renal: Neg GI/Hepatic/Renal ROS            Endo/Other: Negative Endo/Other ROS                    Abdominal:             Vascular: negative vascular ROS.          Other Findings:           Anesthesia Plan      general     ASA 2     (Pt agrees to risks, benefits and alternatives of GETA. Questions answered. Willing to proceed with plan.)  Induction: intravenous. Anesthetic plan and risks discussed with patient.                         Vaibhav Clayton MD   6/10/2022

## 2022-06-10 NOTE — H&P
I have reviewed the history and physical and examined the patient and find no relevant changes. I have reviewed with the patient and/or family the risks, benefits, and alternatives to the procedure. The patient was counseled at length about the risks of anders Covid-19 during their perioperative period and any recovery window from their procedure. The patient was made aware that anders Covid-19  may worsen their prognosis for recovering from their procedure  and lend to a higher morbidity and/or mortality risk. All material risks, benefits, and reasonable alternatives including postponing the procedure were discussed. The patient does wish to proceed with the procedure at this time. Cari Matthews MD  6/10/2022 No intake/output data recorded.

## 2022-06-10 NOTE — PROGRESS NOTES
A: Assessment completed and documented, call light is in reach, discussed plan of care with patient who agreed, presently is having 8/10 lower back pain, describes as burning and intense, medicated per doctors orders, see MAR.

## 2022-06-10 NOTE — DISCHARGE INSTR - DIET
Good nutrition is important when healing from an illness, injury, or surgery. Follow any nutrition recommendations given to you during your hospital stay. If you were given an oral nutrition supplement while in the hospital, continue to take this supplement at home. You can take it with meals, in-between meals, and/or before bedtime. These supplements can be purchased at most local grocery stores, pharmacies, and chain [employer]-stores. If you have any questions about your diet or nutrition, call the hospital and ask for the dietitian.   Advance to your regular diet as tolerated

## 2022-06-10 NOTE — LETTER
Billing and Coding Fee Ticket    Name:SHERI ALAS  : 1955  Diagnosis:    Diagnosis Orders   1. Spinal stenosis of lumbar region with neurogenic claudication  oxyCODONE-acetaminophen (PERCOCET) 5-325 MG per tablet     Surgeon: Armand Cisneros    DOS: 6/10/22    Procedure:  1. Microlumbar bilateral laminotomy, partial facetectomy, and foraminotomy L4-5 31164  2.  Each additional level L5-S1 [id number]

## 2022-06-23 ENCOUNTER — OFFICE VISIT (OUTPATIENT)
Dept: ORTHOPEDIC SURGERY | Age: 67
End: 2022-06-23

## 2022-06-23 VITALS — WEIGHT: 135 LBS | BODY MASS INDEX: 21.19 KG/M2 | HEIGHT: 67 IN

## 2022-06-23 DIAGNOSIS — M51.16 LUMBAR DISC HERNIATION WITH RADICULOPATHY: Primary | ICD-10-CM

## 2022-06-23 PROCEDURE — 99024 POSTOP FOLLOW-UP VISIT: CPT | Performed by: ORTHOPAEDIC SURGERY

## 2022-06-23 NOTE — PROGRESS NOTES
Mr. Ulrich Goes returns today 2 weeks s/p MLL L4-5 and MLD right L5-S1. He reports marked improvement of his leg symptoms. His incisions show no signs of infection. He has a normal gait and 5/5 strength of his ankle DFs/PFs, bilaterally. His sensation is intact from L3 to S1 bilaterally. I cautioned him to avoid lifting more than 10 pounds, bending and impact type aerobic exercise for 8 week after surgery, then slowly increase his activity over the next month.

## 2022-08-30 ENCOUNTER — RX RENEWAL (OUTPATIENT)
Age: 67
End: 2022-08-30

## 2022-09-27 ENCOUNTER — RX RENEWAL (OUTPATIENT)
Age: 67
End: 2022-09-27

## 2022-11-24 ENCOUNTER — RX RENEWAL (OUTPATIENT)
Age: 67
End: 2022-11-24

## 2022-11-25 ENCOUNTER — RX RENEWAL (OUTPATIENT)
Age: 67
End: 2022-11-25

## 2023-01-05 ENCOUNTER — APPOINTMENT (OUTPATIENT)
Dept: FAMILY MEDICINE | Facility: CLINIC | Age: 68
End: 2023-01-05
Payer: MEDICARE

## 2023-01-05 VITALS
DIASTOLIC BLOOD PRESSURE: 80 MMHG | HEIGHT: 70 IN | OXYGEN SATURATION: 96 % | HEART RATE: 66 BPM | BODY MASS INDEX: 36.36 KG/M2 | WEIGHT: 254 LBS | SYSTOLIC BLOOD PRESSURE: 118 MMHG | TEMPERATURE: 97.7 F

## 2023-01-05 DIAGNOSIS — E66.9 OBESITY, UNSPECIFIED: ICD-10-CM

## 2023-01-05 DIAGNOSIS — I49.1 ATRIAL PREMATURE DEPOLARIZATION: ICD-10-CM

## 2023-01-05 DIAGNOSIS — M10.9 GOUT, UNSPECIFIED: ICD-10-CM

## 2023-01-05 DIAGNOSIS — Z23 ENCOUNTER FOR IMMUNIZATION: ICD-10-CM

## 2023-01-05 PROCEDURE — 99214 OFFICE O/P EST MOD 30 MIN: CPT | Mod: 25

## 2023-01-05 PROCEDURE — G0008: CPT

## 2023-01-05 PROCEDURE — 36415 COLL VENOUS BLD VENIPUNCTURE: CPT

## 2023-01-05 PROCEDURE — 90662 IIV NO PRSV INCREASED AG IM: CPT

## 2023-01-05 RX ORDER — ATORVASTATIN CALCIUM 40 MG/1
40 TABLET, FILM COATED ORAL
Qty: 90 | Refills: 1 | Status: ACTIVE | COMMUNITY
Start: 2020-01-06 | End: 1900-01-01

## 2023-01-05 RX ORDER — APIXABAN 5 MG/1
5 TABLET, FILM COATED ORAL
Refills: 0 | Status: ACTIVE | COMMUNITY
Start: 2023-01-05

## 2023-01-05 RX ORDER — PRASUGREL 10 MG/1
10 TABLET, FILM COATED ORAL
Qty: 90 | Refills: 1 | Status: DISCONTINUED | COMMUNITY
Start: 2020-01-06 | End: 2023-01-05

## 2023-01-05 NOTE — PHYSICAL EXAM
[No Acute Distress] : no acute distress [Normal Sclera/Conjunctiva] : normal sclera/conjunctiva [EOMI] : extraocular movements intact [Normal Outer Ear/Nose] : the outer ears and nose were normal in appearance [No JVD] : no jugular venous distention [Normal] : normal rate, regular rhythm, normal S1 and S2 and no murmur heard [Coordination Grossly Intact] : coordination grossly intact [Normal Affect] : the affect was normal [Alert and Oriented x3] : oriented to person, place, and time [Normal Insight/Judgement] : insight and judgment were intact

## 2023-01-06 LAB
ALBUMIN SERPL ELPH-MCNC: 4.6 G/DL
ALP BLD-CCNC: 38 U/L
ALT SERPL-CCNC: 23 U/L
ANION GAP SERPL CALC-SCNC: 12 MMOL/L
AST SERPL-CCNC: 20 U/L
BASOPHILS # BLD AUTO: 0.03 K/UL
BASOPHILS NFR BLD AUTO: 0.5 %
BILIRUB SERPL-MCNC: 0.7 MG/DL
BUN SERPL-MCNC: 19 MG/DL
CALCIUM SERPL-MCNC: 9.4 MG/DL
CHLORIDE SERPL-SCNC: 106 MMOL/L
CHOLEST SERPL-MCNC: 110 MG/DL
CO2 SERPL-SCNC: 25 MMOL/L
CREAT SERPL-MCNC: 0.9 MG/DL
EGFR: 94 ML/MIN/1.73M2
EOSINOPHIL # BLD AUTO: 0.09 K/UL
EOSINOPHIL NFR BLD AUTO: 1.5 %
GLUCOSE SERPL-MCNC: 78 MG/DL
HCT VFR BLD CALC: 43.9 %
HDLC SERPL-MCNC: 32 MG/DL
HGB BLD-MCNC: 14.5 G/DL
IMM GRANULOCYTES NFR BLD AUTO: 0.2 %
LDLC SERPL CALC-MCNC: 62 MG/DL
LYMPHOCYTES # BLD AUTO: 1.77 K/UL
LYMPHOCYTES NFR BLD AUTO: 29.9 %
MAN DIFF?: NORMAL
MCHC RBC-ENTMCNC: 33 GM/DL
MCHC RBC-ENTMCNC: 33.6 PG
MCV RBC AUTO: 101.9 FL
MONOCYTES # BLD AUTO: 0.57 K/UL
MONOCYTES NFR BLD AUTO: 9.6 %
NEUTROPHILS # BLD AUTO: 3.45 K/UL
NEUTROPHILS NFR BLD AUTO: 58.3 %
NONHDLC SERPL-MCNC: 78 MG/DL
PLATELET # BLD AUTO: 228 K/UL
POTASSIUM SERPL-SCNC: 4.9 MMOL/L
PROT SERPL-MCNC: 6.5 G/DL
RBC # BLD: 4.31 M/UL
RBC # FLD: 12.8 %
SODIUM SERPL-SCNC: 142 MMOL/L
TRIGL SERPL-MCNC: 78 MG/DL
URATE SERPL-MCNC: 4.3 MG/DL
WBC # FLD AUTO: 5.92 K/UL

## 2023-01-11 ENCOUNTER — TRANSCRIPTION ENCOUNTER (OUTPATIENT)
Age: 68
End: 2023-01-11

## 2023-04-25 ENCOUNTER — APPOINTMENT (OUTPATIENT)
Dept: FAMILY MEDICINE | Facility: CLINIC | Age: 68
End: 2023-04-25
Payer: MEDICARE

## 2023-04-25 VITALS
WEIGHT: 255 LBS | OXYGEN SATURATION: 97 % | BODY MASS INDEX: 36.51 KG/M2 | HEIGHT: 70 IN | SYSTOLIC BLOOD PRESSURE: 129 MMHG | DIASTOLIC BLOOD PRESSURE: 72 MMHG | HEART RATE: 67 BPM

## 2023-04-25 DIAGNOSIS — Z00.00 ENCOUNTER FOR GENERAL ADULT MEDICAL EXAMINATION W/OUT ABNORMAL FINDINGS: ICD-10-CM

## 2023-04-25 PROCEDURE — 36415 COLL VENOUS BLD VENIPUNCTURE: CPT

## 2023-04-25 PROCEDURE — G0439: CPT

## 2023-04-26 LAB
25(OH)D3 SERPL-MCNC: 25.1 NG/ML
ALBUMIN SERPL ELPH-MCNC: 4.6 G/DL
ALP BLD-CCNC: 43 U/L
ALT SERPL-CCNC: 26 U/L
ANION GAP SERPL CALC-SCNC: 11 MMOL/L
APPEARANCE: CLEAR
AST SERPL-CCNC: 27 U/L
BACTERIA: NEGATIVE /HPF
BASOPHILS # BLD AUTO: 0.02 K/UL
BASOPHILS NFR BLD AUTO: 0.4 %
BILIRUB SERPL-MCNC: 0.3 MG/DL
BILIRUBIN URINE: NEGATIVE
BLOOD URINE: NEGATIVE
BUN SERPL-MCNC: 23 MG/DL
CALCIUM SERPL-MCNC: 9.7 MG/DL
CAST: 0 /LPF
CHLORIDE SERPL-SCNC: 109 MMOL/L
CHOLEST SERPL-MCNC: 105 MG/DL
CO2 SERPL-SCNC: 24 MMOL/L
COLOR: YELLOW
CREAT SERPL-MCNC: 0.92 MG/DL
EGFR: 92 ML/MIN/1.73M2
EOSINOPHIL # BLD AUTO: 0.06 K/UL
EOSINOPHIL NFR BLD AUTO: 1.2 %
EPITHELIAL CELLS: 0 /HPF
ESTIMATED AVERAGE GLUCOSE: 111 MG/DL
GLUCOSE QUALITATIVE U: NEGATIVE MG/DL
GLUCOSE SERPL-MCNC: 105 MG/DL
HBA1C MFR BLD HPLC: 5.5 %
HCT VFR BLD CALC: 43 %
HDLC SERPL-MCNC: 33 MG/DL
HGB BLD-MCNC: 14 G/DL
IMM GRANULOCYTES NFR BLD AUTO: 0.2 %
KETONES URINE: NEGATIVE MG/DL
LDLC SERPL CALC-MCNC: 60 MG/DL
LEUKOCYTE ESTERASE URINE: NEGATIVE
LYMPHOCYTES # BLD AUTO: 1.25 K/UL
LYMPHOCYTES NFR BLD AUTO: 26 %
MAN DIFF?: NORMAL
MCHC RBC-ENTMCNC: 32.6 GM/DL
MCHC RBC-ENTMCNC: 33.6 PG
MCV RBC AUTO: 103.1 FL
MICROSCOPIC-UA: NORMAL
MONOCYTES # BLD AUTO: 0.49 K/UL
MONOCYTES NFR BLD AUTO: 10.2 %
NEUTROPHILS # BLD AUTO: 2.98 K/UL
NEUTROPHILS NFR BLD AUTO: 62 %
NITRITE URINE: NEGATIVE
NONHDLC SERPL-MCNC: 73 MG/DL
PH URINE: 6
PLATELET # BLD AUTO: 200 K/UL
POTASSIUM SERPL-SCNC: 5 MMOL/L
PROT SERPL-MCNC: 6.6 G/DL
PROTEIN URINE: NEGATIVE MG/DL
RBC # BLD: 4.17 M/UL
RBC # FLD: 13.2 %
RED BLOOD CELLS URINE: 0 /HPF
SODIUM SERPL-SCNC: 144 MMOL/L
SPECIFIC GRAVITY URINE: 1.02
TRIGL SERPL-MCNC: 62 MG/DL
TSH SERPL-ACNC: 2.05 UIU/ML
UROBILINOGEN URINE: 0.2 MG/DL
WBC # FLD AUTO: 4.81 K/UL
WHITE BLOOD CELLS URINE: 0 /HPF

## 2023-10-23 ENCOUNTER — LABORATORY RESULT (OUTPATIENT)
Age: 68
End: 2023-10-23

## 2023-10-23 ENCOUNTER — APPOINTMENT (OUTPATIENT)
Dept: FAMILY MEDICINE | Facility: CLINIC | Age: 68
End: 2023-10-23
Payer: MEDICARE

## 2023-10-23 VITALS
RESPIRATION RATE: 17 BRPM | OXYGEN SATURATION: 97 % | DIASTOLIC BLOOD PRESSURE: 76 MMHG | WEIGHT: 235 LBS | BODY MASS INDEX: 33.64 KG/M2 | HEART RATE: 80 BPM | SYSTOLIC BLOOD PRESSURE: 122 MMHG | TEMPERATURE: 98.5 F | HEIGHT: 70 IN

## 2023-10-23 PROCEDURE — 36415 COLL VENOUS BLD VENIPUNCTURE: CPT

## 2023-10-23 PROCEDURE — 99214 OFFICE O/P EST MOD 30 MIN: CPT | Mod: 25

## 2023-10-24 LAB
ALBUMIN SERPL ELPH-MCNC: 4.4 G/DL
ALP BLD-CCNC: 38 U/L
ALT SERPL-CCNC: 26 U/L
ANION GAP SERPL CALC-SCNC: 10 MMOL/L
AST SERPL-CCNC: 23 U/L
BASOPHILS # BLD AUTO: 0.03 K/UL
BASOPHILS NFR BLD AUTO: 0.7 %
BILIRUB SERPL-MCNC: 0.6 MG/DL
BUN SERPL-MCNC: 25 MG/DL
CALCIUM SERPL-MCNC: 9.6 MG/DL
CHLORIDE SERPL-SCNC: 109 MMOL/L
CHOLEST SERPL-MCNC: 135 MG/DL
CO2 SERPL-SCNC: 23 MMOL/L
CREAT SERPL-MCNC: 1 MG/DL
EGFR: 83 ML/MIN/1.73M2
EOSINOPHIL # BLD AUTO: 0.1 K/UL
EOSINOPHIL NFR BLD AUTO: 2.3 %
ESTIMATED AVERAGE GLUCOSE: 111 MG/DL
GLUCOSE SERPL-MCNC: 110 MG/DL
HBA1C MFR BLD HPLC: 5.5 %
HCT VFR BLD CALC: 42.2 %
HDLC SERPL-MCNC: 41 MG/DL
HGB BLD-MCNC: 14.1 G/DL
IMM GRANULOCYTES NFR BLD AUTO: 0.2 %
LDLC SERPL CALC-MCNC: 80 MG/DL
LYMPHOCYTES # BLD AUTO: 1.24 K/UL
LYMPHOCYTES NFR BLD AUTO: 28.1 %
MAN DIFF?: NORMAL
MCHC RBC-ENTMCNC: 33.4 GM/DL
MCHC RBC-ENTMCNC: 34.3 PG
MCV RBC AUTO: 102.7 FL
MONOCYTES # BLD AUTO: 0.4 K/UL
MONOCYTES NFR BLD AUTO: 9.1 %
NEUTROPHILS # BLD AUTO: 2.63 K/UL
NEUTROPHILS NFR BLD AUTO: 59.6 %
NONHDLC SERPL-MCNC: 94 MG/DL
PLATELET # BLD AUTO: 193 K/UL
POTASSIUM SERPL-SCNC: 4.4 MMOL/L
PROT SERPL-MCNC: 6.4 G/DL
RBC # BLD: 4.11 M/UL
RBC # FLD: 13.4 %
SODIUM SERPL-SCNC: 142 MMOL/L
TRIGL SERPL-MCNC: 71 MG/DL
TSH SERPL-ACNC: 2.18 UIU/ML
WBC # FLD AUTO: 4.41 K/UL

## 2023-12-26 ENCOUNTER — NON-APPOINTMENT (OUTPATIENT)
Age: 68
End: 2023-12-26

## 2024-02-14 ENCOUNTER — APPOINTMENT (OUTPATIENT)
Dept: GENERAL RADIOLOGY | Age: 69
End: 2024-02-14
Payer: COMMERCIAL

## 2024-02-14 ENCOUNTER — HOSPITAL ENCOUNTER (EMERGENCY)
Age: 69
Discharge: HOME | End: 2024-02-14
Attending: EMERGENCY MEDICINE
Payer: COMMERCIAL

## 2024-02-14 VITALS
WEIGHT: 146.39 LBS | OXYGEN SATURATION: 98 % | RESPIRATION RATE: 20 BRPM | DIASTOLIC BLOOD PRESSURE: 89 MMHG | HEIGHT: 67 IN | TEMPERATURE: 97.9 F | HEART RATE: 70 BPM | BODY MASS INDEX: 22.98 KG/M2 | SYSTOLIC BLOOD PRESSURE: 150 MMHG

## 2024-02-14 DIAGNOSIS — S61.211A LACERATION OF LEFT INDEX FINGER WITHOUT FOREIGN BODY WITHOUT DAMAGE TO NAIL, INITIAL ENCOUNTER: ICD-10-CM

## 2024-02-14 DIAGNOSIS — S61.012A LACERATION OF LEFT THUMB WITHOUT FOREIGN BODY WITHOUT DAMAGE TO NAIL, INITIAL ENCOUNTER: Primary | ICD-10-CM

## 2024-02-14 PROCEDURE — 73130 X-RAY EXAM OF HAND: CPT

## 2024-02-14 PROCEDURE — 99283 EMERGENCY DEPT VISIT LOW MDM: CPT

## 2024-02-14 PROCEDURE — 12002 RPR S/N/AX/GEN/TRNK2.6-7.5CM: CPT

## 2024-02-14 RX ORDER — HYDROCODONE BITARTRATE AND ACETAMINOPHEN 7.5; 325 MG/1; MG/1
TABLET ORAL
COMMUNITY
Start: 2024-02-12

## 2024-02-14 RX ORDER — CEPHALEXIN 500 MG/1
500 CAPSULE ORAL 4 TIMES DAILY
Qty: 28 CAPSULE | Refills: 0 | Status: SHIPPED | OUTPATIENT
Start: 2024-02-14

## 2024-02-14 RX ORDER — LOSARTAN POTASSIUM 100 MG/1
100 TABLET ORAL DAILY
COMMUNITY
Start: 2024-01-22

## 2024-02-14 NOTE — ED PROVIDER NOTES
Dressing:  Antibiotic ointment and bulky dressing    Procedure completion:  Tolerated well, no immediate complications        FINAL IMPRESSION      1. Laceration of left thumb without foreign body without damage to nail, initial encounter    2. Laceration of left index finger without foreign body without damage to nail, initial encounter          DISPOSITION/PLAN   DISPOSITION Decision To Discharge 02/14/2024 07:12:27 PM      PATIENT REFERRED TO:  Kenyetta Houston PA-C  [address] [place] [id number] [phone]    Schedule an appointment as soon as possible for a visit in 3 days  For wound re-check      DISCHARGE MEDICATIONS:  New Prescriptions    CEPHALEXIN (KEFLEX) 500 MG CAPSULE    Take 1 capsule by mouth 4 times daily       (Please note that portions of this note were completed with a voice recognition program.  Efforts were made to edit the dictations but occasionally words are mis-transcribed.)    KEITH MARY MD  Attending Emergency Physician        Keith Mary MD  02/14/24 1655

## 2024-02-14 NOTE — ED TRIAGE NOTES
Sustained a laceration to his left thumb and left pointer finger while operating a table saw.  Incident occurred about 45 minutes ago.  Bleeding controlled with pressure.

## 2024-02-15 NOTE — ED NOTES
Sutures intact to left hand thumb and index finger/ skin well apprx. Reviewed wound instructions and need for f/u. Pso applied w adaptic and kenrick mcmillan.

## 2024-02-15 NOTE — DISCHARGE INSTRUCTIONS
You can remove the bandage tomorrow.  Clean the areas daily with antibacterial soap and water.  You can keep the wounds covered during the day and leave them open to the air at night.    Elevate and apply ice to help with pain.    Tylenol or ibuprofen every 6 hours as needed for pain.    Antibiotics as prescribed until completed.    You should follow-up in 3 to 5 days for wound recheck.  The sutures need to be removed in 12 to 14 days.    If you have any redness, drainage, signs of infection you should return immediately.

## 2024-04-22 ENCOUNTER — APPOINTMENT (OUTPATIENT)
Dept: FAMILY MEDICINE | Facility: CLINIC | Age: 69
End: 2024-04-22
Payer: MEDICARE

## 2024-04-22 VITALS
WEIGHT: 237 LBS | OXYGEN SATURATION: 96 % | SYSTOLIC BLOOD PRESSURE: 120 MMHG | HEART RATE: 79 BPM | TEMPERATURE: 98.7 F | BODY MASS INDEX: 33.93 KG/M2 | RESPIRATION RATE: 17 BRPM | HEIGHT: 70 IN | DIASTOLIC BLOOD PRESSURE: 72 MMHG

## 2024-04-22 DIAGNOSIS — E78.00 PURE HYPERCHOLESTEROLEMIA, UNSPECIFIED: ICD-10-CM

## 2024-04-22 DIAGNOSIS — I25.10 ATHEROSCLEROTIC HEART DISEASE OF NATIVE CORONARY ARTERY W/OUT ANGINA PECTORIS: ICD-10-CM

## 2024-04-22 DIAGNOSIS — I48.0 PAROXYSMAL ATRIAL FIBRILLATION: ICD-10-CM

## 2024-04-22 DIAGNOSIS — I10 ESSENTIAL (PRIMARY) HYPERTENSION: ICD-10-CM

## 2024-04-22 PROCEDURE — 36415 COLL VENOUS BLD VENIPUNCTURE: CPT

## 2024-04-22 PROCEDURE — 99214 OFFICE O/P EST MOD 30 MIN: CPT | Mod: 25

## 2024-04-23 LAB
25(OH)D3 SERPL-MCNC: 25.4 NG/ML
ALBUMIN SERPL ELPH-MCNC: 4.5 G/DL
ALP BLD-CCNC: 38 U/L
ALT SERPL-CCNC: 29 U/L
ANION GAP SERPL CALC-SCNC: 10 MMOL/L
AST SERPL-CCNC: 33 U/L
BASOPHILS # BLD AUTO: 0.02 K/UL
BASOPHILS NFR BLD AUTO: 0.4 %
BILIRUB SERPL-MCNC: 0.6 MG/DL
BUN SERPL-MCNC: 18 MG/DL
CALCIUM SERPL-MCNC: 9.4 MG/DL
CHLORIDE SERPL-SCNC: 109 MMOL/L
CHOLEST SERPL-MCNC: 112 MG/DL
CO2 SERPL-SCNC: 24 MMOL/L
CREAT SERPL-MCNC: 1 MG/DL
EGFR: 82 ML/MIN/1.73M2
EOSINOPHIL # BLD AUTO: 0.06 K/UL
EOSINOPHIL NFR BLD AUTO: 1.3 %
ESTIMATED AVERAGE GLUCOSE: 114 MG/DL
GLUCOSE SERPL-MCNC: 107 MG/DL
HBA1C MFR BLD HPLC: 5.6 %
HCT VFR BLD CALC: 42 %
HDLC SERPL-MCNC: 36 MG/DL
HGB BLD-MCNC: 14 G/DL
IMM GRANULOCYTES NFR BLD AUTO: 0.2 %
INSULIN SERPL-MCNC: 23.2 UU/ML
LDLC SERPL CALC-MCNC: 63 MG/DL
LYMPHOCYTES # BLD AUTO: 1.21 K/UL
LYMPHOCYTES NFR BLD AUTO: 27.2 %
MAN DIFF?: NORMAL
MCHC RBC-ENTMCNC: 33.3 GM/DL
MCHC RBC-ENTMCNC: 34.1 PG
MCV RBC AUTO: 102.2 FL
MONOCYTES # BLD AUTO: 0.46 K/UL
MONOCYTES NFR BLD AUTO: 10.3 %
NEUTROPHILS # BLD AUTO: 2.69 K/UL
NEUTROPHILS NFR BLD AUTO: 60.6 %
NONHDLC SERPL-MCNC: 76 MG/DL
PLATELET # BLD AUTO: 188 K/UL
POTASSIUM SERPL-SCNC: 4.8 MMOL/L
PROT SERPL-MCNC: 6.3 G/DL
RBC # BLD: 4.11 M/UL
RBC # FLD: 13.2 %
SODIUM SERPL-SCNC: 142 MMOL/L
TRIGL SERPL-MCNC: 57 MG/DL
TSH SERPL-ACNC: 3.03 UIU/ML
URATE SERPL-MCNC: 5.1 MG/DL
WBC # FLD AUTO: 4.45 K/UL

## 2024-09-24 ENCOUNTER — APPOINTMENT (OUTPATIENT)
Dept: FAMILY MEDICINE | Facility: CLINIC | Age: 69
End: 2024-09-24
Payer: MEDICARE

## 2024-09-24 VITALS
RESPIRATION RATE: 17 BRPM | OXYGEN SATURATION: 96 % | WEIGHT: 240 LBS | DIASTOLIC BLOOD PRESSURE: 73 MMHG | BODY MASS INDEX: 34.36 KG/M2 | HEIGHT: 70 IN | SYSTOLIC BLOOD PRESSURE: 123 MMHG | TEMPERATURE: 97.6 F | HEART RATE: 86 BPM

## 2024-09-24 DIAGNOSIS — I48.0 PAROXYSMAL ATRIAL FIBRILLATION: ICD-10-CM

## 2024-09-24 DIAGNOSIS — E78.00 PURE HYPERCHOLESTEROLEMIA, UNSPECIFIED: ICD-10-CM

## 2024-09-24 DIAGNOSIS — M10.9 GOUT, UNSPECIFIED: ICD-10-CM

## 2024-09-24 DIAGNOSIS — I10 ESSENTIAL (PRIMARY) HYPERTENSION: ICD-10-CM

## 2024-09-24 DIAGNOSIS — R73.9 HYPERGLYCEMIA, UNSPECIFIED: ICD-10-CM

## 2024-09-24 PROCEDURE — 99214 OFFICE O/P EST MOD 30 MIN: CPT

## 2024-09-24 PROCEDURE — 36415 COLL VENOUS BLD VENIPUNCTURE: CPT

## 2024-09-25 LAB
ALBUMIN SERPL ELPH-MCNC: 4.6 G/DL
ALP BLD-CCNC: 42 U/L
ALT SERPL-CCNC: 34 U/L
ANION GAP SERPL CALC-SCNC: 11 MMOL/L
AST SERPL-CCNC: 31 U/L
BASOPHILS # BLD AUTO: 0.02 K/UL
BASOPHILS NFR BLD AUTO: 0.4 %
BILIRUB SERPL-MCNC: 0.5 MG/DL
BUN SERPL-MCNC: 20 MG/DL
CALCIUM SERPL-MCNC: 9.5 MG/DL
CHLORIDE SERPL-SCNC: 108 MMOL/L
CHOLEST SERPL-MCNC: 130 MG/DL
CO2 SERPL-SCNC: 24 MMOL/L
CREAT SERPL-MCNC: 0.92 MG/DL
EGFR: 91 ML/MIN/1.73M2
EOSINOPHIL # BLD AUTO: 0.06 K/UL
EOSINOPHIL NFR BLD AUTO: 1.1 %
GLUCOSE SERPL-MCNC: 86 MG/DL
HCT VFR BLD CALC: 44 %
HDLC SERPL-MCNC: 40 MG/DL
HGB BLD-MCNC: 14.8 G/DL
IMM GRANULOCYTES NFR BLD AUTO: 0.2 %
LDLC SERPL CALC-MCNC: 76 MG/DL
LYMPHOCYTES # BLD AUTO: 1.58 K/UL
LYMPHOCYTES NFR BLD AUTO: 28 %
MAN DIFF?: NORMAL
MCHC RBC-ENTMCNC: 33.6 GM/DL
MCHC RBC-ENTMCNC: 34.3 PG
MCV RBC AUTO: 102.1 FL
MONOCYTES # BLD AUTO: 0.53 K/UL
MONOCYTES NFR BLD AUTO: 9.4 %
NEUTROPHILS # BLD AUTO: 3.45 K/UL
NEUTROPHILS NFR BLD AUTO: 60.9 %
NONHDLC SERPL-MCNC: 90 MG/DL
PLATELET # BLD AUTO: 211 K/UL
POTASSIUM SERPL-SCNC: 5 MMOL/L
PROT SERPL-MCNC: 6.9 G/DL
RBC # BLD: 4.31 M/UL
RBC # FLD: 13.4 %
SODIUM SERPL-SCNC: 143 MMOL/L
TRIGL SERPL-MCNC: 65 MG/DL
TSH SERPL-ACNC: 2.16 UIU/ML
WBC # FLD AUTO: 5.65 K/UL

## 2025-04-21 ENCOUNTER — APPOINTMENT (OUTPATIENT)
Dept: FAMILY MEDICINE | Facility: CLINIC | Age: 70
End: 2025-04-21
Payer: MEDICARE

## 2025-04-21 ENCOUNTER — RX RENEWAL (OUTPATIENT)
Age: 70
End: 2025-04-21

## 2025-04-21 VITALS
OXYGEN SATURATION: 96 % | WEIGHT: 240 LBS | HEART RATE: 65 BPM | HEIGHT: 70 IN | BODY MASS INDEX: 34.36 KG/M2 | RESPIRATION RATE: 17 BRPM | DIASTOLIC BLOOD PRESSURE: 67 MMHG | SYSTOLIC BLOOD PRESSURE: 128 MMHG

## 2025-04-21 DIAGNOSIS — M10.9 GOUT, UNSPECIFIED: ICD-10-CM

## 2025-04-21 DIAGNOSIS — I48.0 PAROXYSMAL ATRIAL FIBRILLATION: ICD-10-CM

## 2025-04-21 DIAGNOSIS — E78.00 PURE HYPERCHOLESTEROLEMIA, UNSPECIFIED: ICD-10-CM

## 2025-04-21 DIAGNOSIS — Z00.00 ENCOUNTER FOR GENERAL ADULT MEDICAL EXAMINATION W/OUT ABNORMAL FINDINGS: ICD-10-CM

## 2025-04-21 DIAGNOSIS — I10 ESSENTIAL (PRIMARY) HYPERTENSION: ICD-10-CM

## 2025-04-21 DIAGNOSIS — E55.9 VITAMIN D DEFICIENCY, UNSPECIFIED: ICD-10-CM

## 2025-04-21 DIAGNOSIS — I25.10 ATHEROSCLEROTIC HEART DISEASE OF NATIVE CORONARY ARTERY W/OUT ANGINA PECTORIS: ICD-10-CM

## 2025-04-21 PROCEDURE — G0439: CPT

## 2025-04-21 PROCEDURE — 36415 COLL VENOUS BLD VENIPUNCTURE: CPT

## 2025-04-22 LAB
25(OH)D3 SERPL-MCNC: 45.7 NG/ML
ALBUMIN SERPL ELPH-MCNC: 4.4 G/DL
ALP BLD-CCNC: 42 U/L
ALT SERPL-CCNC: 33 U/L
ANION GAP SERPL CALC-SCNC: 14 MMOL/L
APPEARANCE: CLEAR
AST SERPL-CCNC: 36 U/L
BACTERIA: NEGATIVE /HPF
BASOPHILS # BLD AUTO: 0.04 K/UL
BASOPHILS NFR BLD AUTO: 0.6 %
BILIRUB SERPL-MCNC: 0.4 MG/DL
BILIRUBIN URINE: NEGATIVE
BLOOD URINE: NEGATIVE
BUN SERPL-MCNC: 28 MG/DL
CALCIUM SERPL-MCNC: 9.4 MG/DL
CAST: 2 /LPF
CHLORIDE SERPL-SCNC: 106 MMOL/L
CHOLEST SERPL-MCNC: 118 MG/DL
CO2 SERPL-SCNC: 23 MMOL/L
COLOR: YELLOW
CREAT SERPL-MCNC: 1 MG/DL
EGFRCR SERPLBLD CKD-EPI 2021: 82 ML/MIN/1.73M2
EOSINOPHIL # BLD AUTO: 0.09 K/UL
EOSINOPHIL NFR BLD AUTO: 1.4 %
EPITHELIAL CELLS: 0 /HPF
ESTIMATED AVERAGE GLUCOSE: 111 MG/DL
GLUCOSE QUALITATIVE U: NEGATIVE MG/DL
GLUCOSE SERPL-MCNC: 93 MG/DL
HBA1C MFR BLD HPLC: 5.5 %
HCT VFR BLD CALC: 43.5 %
HDLC SERPL-MCNC: 33 MG/DL
HGB BLD-MCNC: 14.7 G/DL
HYALINE CASTS: PRESENT
IMM GRANULOCYTES NFR BLD AUTO: 0.3 %
KETONES URINE: 40 MG/DL
LDLC SERPL-MCNC: 70 MG/DL
LEUKOCYTE ESTERASE URINE: NEGATIVE
LYMPHOCYTES # BLD AUTO: 1.5 K/UL
LYMPHOCYTES NFR BLD AUTO: 22.7 %
MAN DIFF?: NORMAL
MCHC RBC-ENTMCNC: 33.8 G/DL
MCHC RBC-ENTMCNC: 35.1 PG
MCV RBC AUTO: 103.8 FL
MICROSCOPIC-UA: NORMAL
MONOCYTES # BLD AUTO: 0.56 K/UL
MONOCYTES NFR BLD AUTO: 8.5 %
MUCUS: PRESENT
NEUTROPHILS # BLD AUTO: 4.41 K/UL
NEUTROPHILS NFR BLD AUTO: 66.5 %
NITRITE URINE: NEGATIVE
NONHDLC SERPL-MCNC: 85 MG/DL
PH URINE: 5.5
PLATELET # BLD AUTO: 205 K/UL
POTASSIUM SERPL-SCNC: 4.9 MMOL/L
PROT SERPL-MCNC: 6.4 G/DL
PROTEIN URINE: NORMAL MG/DL
PSA SERPL-MCNC: 0.56 NG/ML
RBC # BLD: 4.19 M/UL
RBC # FLD: 13.5 %
RED BLOOD CELLS URINE: 1 /HPF
REVIEW: NORMAL
SODIUM SERPL-SCNC: 143 MMOL/L
SPECIFIC GRAVITY URINE: >1.03
TRIGL SERPL-MCNC: 77 MG/DL
TSH SERPL-ACNC: 2.94 UIU/ML
UROBILINOGEN URINE: 0.2 MG/DL
WBC # FLD AUTO: 6.62 K/UL
WHITE BLOOD CELLS URINE: 1 /HPF

## (undated) DEVICE — SUTURE VCRL SZ 0 L27IN ABSRB UD L26MM CT-2 1/2 CIR J270H

## (undated) DEVICE — SUTURE VCRL + SZ 3-0 L27IN ABSRB UD L26MM SH 1/2 CIR VCP416H

## (undated) DEVICE — Device

## (undated) DEVICE — GLOVE ORANGE PI 7 1/2   MSG9075

## (undated) DEVICE — KIT JACK TBL PT CARE

## (undated) DEVICE — SURGIFOAM SPNG SZ 12-7

## (undated) DEVICE — SUTURE MCRYL + SZ 4-0 L18IN ABSRB UD L19MM PS-2 3/8 CIR MCP496G

## (undated) DEVICE — SOLUTION IV IRRIG POUR BRL 0.9% SODIUM CHL 2F7124